# Patient Record
Sex: MALE | Race: WHITE | NOT HISPANIC OR LATINO | Employment: OTHER | URBAN - METROPOLITAN AREA
[De-identification: names, ages, dates, MRNs, and addresses within clinical notes are randomized per-mention and may not be internally consistent; named-entity substitution may affect disease eponyms.]

---

## 2018-05-25 ENCOUNTER — TELEPHONE (OUTPATIENT)
Dept: PAIN MEDICINE | Facility: MEDICAL CENTER | Age: 63
End: 2018-05-25

## 2018-05-25 NOTE — TELEPHONE ENCOUNTER
[facility]- patient called to schedule  Intake completed and sent to [facility] office  Patient to have prior records sent for review   (Dr Nancy Burton- [phone])

## 2018-06-07 ENCOUNTER — TELEPHONE (OUTPATIENT)
Dept: PAIN MEDICINE | Facility: CLINIC | Age: 63
End: 2018-06-07

## 2018-06-07 NOTE — TELEPHONE ENCOUNTER
Intake completed per Livia White at Dr Rajan Lei office fax is down unable will try to request prior pain management records

## 2018-06-07 NOTE — TELEPHONE ENCOUNTER
Lmom with Dr Es Castañeda office to call back to complete intake  Per message it states this office is a ortho office once I receive call back I will confirm and ask if they are also a pain management office

## 2018-06-12 NOTE — TELEPHONE ENCOUNTER
Pt called asking to make appt, informed him that we are still waiting to get pain records from Dr Silvino Herring office  Pt began to say that he would like to see Dr Henrique Trivedi now for a new problem while we wait for Dr Karyn Ring to fax over records  Informed pt that he cannot be seen until Dr Henrique Trivedi receives all pain records and reviews them  Pt then said he was going to get a referral from his PCP to be seen right away, informed pt again that we cannot schedule until we receive prior pain records  Pt said okay and disconnected  Please advise

## 2018-06-13 ENCOUNTER — TELEPHONE (OUTPATIENT)
Dept: PAIN MEDICINE | Facility: CLINIC | Age: 63
End: 2018-06-13

## 2018-06-13 NOTE — TELEPHONE ENCOUNTER
Received physician order from [facility] from Dr Nancy Irwin office waiting on prior pain management records

## 2018-06-19 NOTE — TELEPHONE ENCOUNTER
Pt is calling to make appt  Informed pt we are still waiting for the prior pain records  Pt states it was faxed over a couple of days ago, no task here indicating it was  Please advise

## 2018-06-25 ENCOUNTER — TELEPHONE (OUTPATIENT)
Dept: PAIN MEDICINE | Facility: CLINIC | Age: 63
End: 2018-06-25

## 2018-06-26 ENCOUNTER — TELEPHONE (OUTPATIENT)
Dept: PAIN MEDICINE | Facility: CLINIC | Age: 63
End: 2018-06-26

## 2020-12-11 ENCOUNTER — APPOINTMENT (EMERGENCY)
Dept: RADIOLOGY | Facility: HOSPITAL | Age: 65
End: 2020-12-11
Payer: COMMERCIAL

## 2020-12-11 ENCOUNTER — APPOINTMENT (EMERGENCY)
Dept: CT IMAGING | Facility: HOSPITAL | Age: 65
End: 2020-12-11
Payer: COMMERCIAL

## 2020-12-11 ENCOUNTER — HOSPITAL ENCOUNTER (OUTPATIENT)
Facility: HOSPITAL | Age: 65
Setting detail: OBSERVATION
Discharge: HOME | End: 2020-12-13
Attending: INTERNAL MEDICINE | Admitting: INTERNAL MEDICINE
Payer: COMMERCIAL

## 2020-12-11 DIAGNOSIS — E11.65 HYPERGLYCEMIA DUE TO DIABETES MELLITUS (HCC): ICD-10-CM

## 2020-12-11 DIAGNOSIS — R53.1 WEAKNESS: Primary | ICD-10-CM

## 2020-12-11 DIAGNOSIS — Z72.0 TOBACCO ABUSE: ICD-10-CM

## 2020-12-11 DIAGNOSIS — F10.929 ALCOHOL INTOXICATION (HCC): ICD-10-CM

## 2020-12-11 DIAGNOSIS — E11.9 TYPE 2 DIABETES MELLITUS (HCC): ICD-10-CM

## 2020-12-11 PROBLEM — R79.89 LFT ELEVATION: Status: ACTIVE | Noted: 2020-12-11

## 2020-12-11 LAB
ALBUMIN SERPL BCP-MCNC: 3.9 G/DL (ref 3.4–4.8)
ALP SERPL-CCNC: 85.5 U/L (ref 10–129)
ALT SERPL W P-5'-P-CCNC: 210 U/L (ref 5–63)
ANION GAP SERPL CALCULATED.3IONS-SCNC: 12 MMOL/L (ref 4–13)
AST SERPL W P-5'-P-CCNC: 40 U/L (ref 15–41)
ATRIAL RATE: 83 BPM
BASOPHILS # BLD AUTO: 0.05 THOUSANDS/ΜL (ref 0–0.1)
BASOPHILS NFR BLD AUTO: 1 % (ref 0–1)
BILIRUB SERPL-MCNC: 0.57 MG/DL (ref 0.3–1.2)
BUN SERPL-MCNC: 7 MG/DL (ref 6–20)
CALCIUM SERPL-MCNC: 8.6 MG/DL (ref 8.4–10.2)
CHLORIDE SERPL-SCNC: 97 MMOL/L (ref 96–108)
CO2 SERPL-SCNC: 28 MMOL/L (ref 22–33)
CREAT SERPL-MCNC: 0.75 MG/DL (ref 0.5–1.2)
EOSINOPHIL # BLD AUTO: 0.03 THOUSAND/ΜL (ref 0–0.61)
EOSINOPHIL NFR BLD AUTO: 1 % (ref 0–6)
ERYTHROCYTE [DISTWIDTH] IN BLOOD BY AUTOMATED COUNT: 12.9 % (ref 11.6–15.1)
ETHANOL SERPL-MCNC: 389.3 MG/DL
GFR SERPL CREATININE-BSD FRML MDRD: 98 ML/MIN/1.73SQ M
GLUCOSE SERPL-MCNC: 186 MG/DL (ref 65–140)
GLUCOSE SERPL-MCNC: 353 MG/DL (ref 65–140)
HCT VFR BLD AUTO: 42.1 % (ref 36.5–49.3)
HGB BLD-MCNC: 14.3 G/DL (ref 12–17)
IMM GRANULOCYTES # BLD AUTO: 0.03 THOUSAND/UL (ref 0–0.2)
IMM GRANULOCYTES NFR BLD AUTO: 1 % (ref 0–2)
LIPASE SERPL-CCNC: 10 U/L (ref 13–60)
LYMPHOCYTES # BLD AUTO: 2.35 THOUSANDS/ΜL (ref 0.6–4.47)
LYMPHOCYTES NFR BLD AUTO: 42 % (ref 14–44)
MCH RBC QN AUTO: 37.1 PG (ref 26.8–34.3)
MCHC RBC AUTO-ENTMCNC: 34 G/DL (ref 31.4–37.4)
MCV RBC AUTO: 109 FL (ref 82–98)
MONOCYTES # BLD AUTO: 0.69 THOUSAND/ΜL (ref 0.17–1.22)
MONOCYTES NFR BLD AUTO: 12 % (ref 4–12)
NEUTROPHILS # BLD AUTO: 2.47 THOUSANDS/ΜL (ref 1.85–7.62)
NEUTS SEG NFR BLD AUTO: 43 % (ref 43–75)
P AXIS: -5 DEGREES
PLATELET # BLD AUTO: 239 THOUSANDS/UL (ref 149–390)
PMV BLD AUTO: 9.5 FL (ref 8.9–12.7)
POTASSIUM SERPL-SCNC: 3.5 MMOL/L (ref 3.5–5)
PR INTERVAL: 138 MS
PROT SERPL-MCNC: 6.6 G/DL (ref 6.4–8.3)
QRS AXIS: 71 DEGREES
QRSD INTERVAL: 90 MS
QT INTERVAL: 380 MS
QTC INTERVAL: 447 MS
RBC # BLD AUTO: 3.85 MILLION/UL (ref 3.88–5.62)
SODIUM SERPL-SCNC: 137 MMOL/L (ref 133–145)
T WAVE AXIS: 241 DEGREES
VENTRICULAR RATE: 83 BPM
WBC # BLD AUTO: 5.62 THOUSAND/UL (ref 4.31–10.16)

## 2020-12-11 PROCEDURE — 70450 CT HEAD/BRAIN W/O DYE: CPT

## 2020-12-11 PROCEDURE — 93010 ELECTROCARDIOGRAM REPORT: CPT | Performed by: INTERNAL MEDICINE

## 2020-12-11 PROCEDURE — G1004 CDSM NDSC: HCPCS

## 2020-12-11 PROCEDURE — 99285 EMERGENCY DEPT VISIT HI MDM: CPT | Performed by: PHYSICIAN ASSISTANT

## 2020-12-11 PROCEDURE — 83690 ASSAY OF LIPASE: CPT | Performed by: PHYSICIAN ASSISTANT

## 2020-12-11 PROCEDURE — 36415 COLL VENOUS BLD VENIPUNCTURE: CPT | Performed by: PHYSICIAN ASSISTANT

## 2020-12-11 PROCEDURE — 80320 DRUG SCREEN QUANTALCOHOLS: CPT | Performed by: PHYSICIAN ASSISTANT

## 2020-12-11 PROCEDURE — 80053 COMPREHEN METABOLIC PANEL: CPT | Performed by: PHYSICIAN ASSISTANT

## 2020-12-11 PROCEDURE — 82948 REAGENT STRIP/BLOOD GLUCOSE: CPT

## 2020-12-11 PROCEDURE — 99220 PR INITIAL OBSERVATION CARE/DAY 70 MINUTES: CPT | Performed by: INTERNAL MEDICINE

## 2020-12-11 PROCEDURE — 71045 X-RAY EXAM CHEST 1 VIEW: CPT

## 2020-12-11 PROCEDURE — 90682 RIV4 VACC RECOMBINANT DNA IM: CPT | Performed by: INTERNAL MEDICINE

## 2020-12-11 PROCEDURE — 96360 HYDRATION IV INFUSION INIT: CPT

## 2020-12-11 PROCEDURE — 85025 COMPLETE CBC W/AUTO DIFF WBC: CPT | Performed by: PHYSICIAN ASSISTANT

## 2020-12-11 PROCEDURE — 93005 ELECTROCARDIOGRAM TRACING: CPT

## 2020-12-11 PROCEDURE — 99285 EMERGENCY DEPT VISIT HI MDM: CPT

## 2020-12-11 PROCEDURE — 90471 IMMUNIZATION ADMIN: CPT | Performed by: INTERNAL MEDICINE

## 2020-12-11 PROCEDURE — 94762 N-INVAS EAR/PLS OXIMTRY CONT: CPT

## 2020-12-11 PROCEDURE — 83036 HEMOGLOBIN GLYCOSYLATED A1C: CPT | Performed by: INTERNAL MEDICINE

## 2020-12-11 RX ORDER — NICOTINE 21 MG/24HR
1 PATCH, TRANSDERMAL 24 HOURS TRANSDERMAL DAILY
Status: DISCONTINUED | OUTPATIENT
Start: 2020-12-12 | End: 2020-12-13 | Stop reason: HOSPADM

## 2020-12-11 RX ORDER — ONDANSETRON 2 MG/ML
4 INJECTION INTRAMUSCULAR; INTRAVENOUS EVERY 6 HOURS PRN
Status: DISCONTINUED | OUTPATIENT
Start: 2020-12-11 | End: 2020-12-13 | Stop reason: HOSPADM

## 2020-12-11 RX ORDER — DEXTROSE AND SODIUM CHLORIDE 5; .45 G/100ML; G/100ML
75 INJECTION, SOLUTION INTRAVENOUS CONTINUOUS
Status: DISCONTINUED | OUTPATIENT
Start: 2020-12-11 | End: 2020-12-11

## 2020-12-11 RX ORDER — LANOLIN ALCOHOL/MO/W.PET/CERES
400 CREAM (GRAM) TOPICAL DAILY
Status: DISCONTINUED | OUTPATIENT
Start: 2020-12-12 | End: 2020-12-13 | Stop reason: HOSPADM

## 2020-12-11 RX ORDER — SODIUM CHLORIDE, SODIUM LACTATE, POTASSIUM CHLORIDE, CALCIUM CHLORIDE 600; 310; 30; 20 MG/100ML; MG/100ML; MG/100ML; MG/100ML
50 INJECTION, SOLUTION INTRAVENOUS CONTINUOUS
Status: DISCONTINUED | OUTPATIENT
Start: 2020-12-11 | End: 2020-12-13 | Stop reason: HOSPADM

## 2020-12-11 RX ORDER — THIAMINE MONONITRATE (VIT B1) 100 MG
100 TABLET ORAL DAILY
Status: DISCONTINUED | OUTPATIENT
Start: 2020-12-12 | End: 2020-12-13 | Stop reason: HOSPADM

## 2020-12-11 RX ORDER — ACETAMINOPHEN 325 MG/1
650 TABLET ORAL EVERY 6 HOURS PRN
Status: DISCONTINUED | OUTPATIENT
Start: 2020-12-11 | End: 2020-12-13 | Stop reason: HOSPADM

## 2020-12-11 RX ADMIN — INSULIN HUMAN 10 UNITS: 100 INJECTION, SOLUTION PARENTERAL at 17:03

## 2020-12-11 RX ADMIN — INFLUENZA A VIRUS A/HAWAII/70/2019 (H1N1) RECOMBINANT HEMAGGLUTININ ANTIGEN, INFLUENZA A VIRUS A/MINNESOTA/41/2019 (H3N2) RECOMBINANT HEMAGGLUTININ ANTIGEN, INFLUENZA B VIRUS B/WASHINGTON/02/2019 RECOMBINANT HEMAGGLUTININ ANTIGEN, AND INFLUENZA B VIRUS B/PHUKET/3073/2013 RECOMBINANT HEMAGGLUTININ ANTIGEN 0.5 ML: 45; 45; 45; 45 INJECTION INTRAMUSCULAR at 20:59

## 2020-12-11 RX ADMIN — SODIUM CHLORIDE 1000 ML: 0.9 INJECTION, SOLUTION INTRAVENOUS at 16:02

## 2020-12-11 RX ADMIN — SODIUM CHLORIDE, SODIUM LACTATE, POTASSIUM CHLORIDE, AND CALCIUM CHLORIDE 50 ML/HR: .6; .31; .03; .02 INJECTION, SOLUTION INTRAVENOUS at 20:58

## 2020-12-12 LAB
ALBUMIN SERPL BCP-MCNC: 3.3 G/DL (ref 3.4–4.8)
ALP SERPL-CCNC: 73.7 U/L (ref 10–129)
ALT SERPL W P-5'-P-CCNC: 158 U/L (ref 5–63)
AMPHETAMINES SERPL QL SCN: NEGATIVE
ANION GAP SERPL CALCULATED.3IONS-SCNC: 11 MMOL/L (ref 4–13)
AST SERPL W P-5'-P-CCNC: 52 U/L (ref 15–41)
BACTERIA UR QL AUTO: ABNORMAL /HPF
BARBITURATES UR QL: NEGATIVE
BASOPHILS # BLD AUTO: 0.03 THOUSANDS/ΜL (ref 0–0.1)
BASOPHILS NFR BLD AUTO: 0 % (ref 0–1)
BENZODIAZ UR QL: NEGATIVE
BILIRUB SERPL-MCNC: 0.95 MG/DL (ref 0.3–1.2)
BILIRUB UR QL STRIP: NEGATIVE
BUN SERPL-MCNC: 8 MG/DL (ref 6–20)
CALCIUM ALBUM COR SERPL-MCNC: 9 MG/DL (ref 8.3–10.1)
CALCIUM SERPL-MCNC: 8.4 MG/DL (ref 8.4–10.2)
CHLORIDE SERPL-SCNC: 99 MMOL/L (ref 96–108)
CLARITY UR: CLEAR
CO2 SERPL-SCNC: 25 MMOL/L (ref 22–33)
COCAINE UR QL: NEGATIVE
COLOR UR: YELLOW
CREAT SERPL-MCNC: 0.61 MG/DL (ref 0.5–1.2)
EOSINOPHIL # BLD AUTO: 0.01 THOUSAND/ΜL (ref 0–0.61)
EOSINOPHIL NFR BLD AUTO: 0 % (ref 0–6)
ERYTHROCYTE [DISTWIDTH] IN BLOOD BY AUTOMATED COUNT: 12.9 % (ref 11.6–15.1)
EST. AVERAGE GLUCOSE BLD GHB EST-MCNC: 203 MG/DL
GFR SERPL CREATININE-BSD FRML MDRD: 106 ML/MIN/1.73SQ M
GLUCOSE P FAST SERPL-MCNC: 236 MG/DL (ref 70–105)
GLUCOSE SERPL-MCNC: 202 MG/DL (ref 65–140)
GLUCOSE SERPL-MCNC: 236 MG/DL (ref 65–140)
GLUCOSE SERPL-MCNC: 258 MG/DL (ref 65–140)
GLUCOSE SERPL-MCNC: 276 MG/DL (ref 65–140)
GLUCOSE UR STRIP-MCNC: ABNORMAL MG/DL
HBA1C MFR BLD: 8.7 %
HCT VFR BLD AUTO: 35.8 % (ref 36.5–49.3)
HGB BLD-MCNC: 12.2 G/DL (ref 12–17)
HGB UR QL STRIP.AUTO: ABNORMAL
IMM GRANULOCYTES # BLD AUTO: 0.02 THOUSAND/UL (ref 0–0.2)
IMM GRANULOCYTES NFR BLD AUTO: 0 % (ref 0–2)
KETONES UR STRIP-MCNC: ABNORMAL MG/DL
LEUKOCYTE ESTERASE UR QL STRIP: NEGATIVE
LYMPHOCYTES # BLD AUTO: 1.42 THOUSANDS/ΜL (ref 0.6–4.47)
LYMPHOCYTES NFR BLD AUTO: 16 % (ref 14–44)
MAGNESIUM SERPL-MCNC: 1.6 MG/DL (ref 1.6–2.6)
MCH RBC QN AUTO: 37.1 PG (ref 26.8–34.3)
MCHC RBC AUTO-ENTMCNC: 34.1 G/DL (ref 31.4–37.4)
MCV RBC AUTO: 109 FL (ref 82–98)
METHADONE UR QL: NEGATIVE
MONOCYTES # BLD AUTO: 1.35 THOUSAND/ΜL (ref 0.17–1.22)
MONOCYTES NFR BLD AUTO: 15 % (ref 4–12)
MUCOUS THREADS UR QL AUTO: ABNORMAL
NEUTROPHILS # BLD AUTO: 6.24 THOUSANDS/ΜL (ref 1.85–7.62)
NEUTS SEG NFR BLD AUTO: 69 % (ref 43–75)
NITRITE UR QL STRIP: NEGATIVE
NON-SQ EPI CELLS URNS QL MICRO: ABNORMAL /HPF
OPIATES UR QL SCN: NEGATIVE
OXYCODONE+OXYMORPHONE UR QL SCN: NEGATIVE
PCP UR QL: NEGATIVE
PH UR STRIP.AUTO: 6 [PH]
PLATELET # BLD AUTO: 201 THOUSANDS/UL (ref 149–390)
PMV BLD AUTO: 9.7 FL (ref 8.9–12.7)
POTASSIUM SERPL-SCNC: 3.7 MMOL/L (ref 3.5–5)
PROT SERPL-MCNC: 5.7 G/DL (ref 6.4–8.3)
PROT UR STRIP-MCNC: ABNORMAL MG/DL
RBC # BLD AUTO: 3.29 MILLION/UL (ref 3.88–5.62)
RBC #/AREA URNS AUTO: ABNORMAL /HPF
SODIUM SERPL-SCNC: 135 MMOL/L (ref 133–145)
SP GR UR STRIP.AUTO: 1.02 (ref 1–1.03)
THC UR QL: NEGATIVE
TSH SERPL DL<=0.05 MIU/L-ACNC: 0.63 UIU/ML (ref 0.34–5.6)
UROBILINOGEN UR QL STRIP.AUTO: 1 E.U./DL
WBC # BLD AUTO: 9.07 THOUSAND/UL (ref 4.31–10.16)
WBC #/AREA URNS AUTO: ABNORMAL /HPF

## 2020-12-12 PROCEDURE — 81001 URINALYSIS AUTO W/SCOPE: CPT | Performed by: PHYSICIAN ASSISTANT

## 2020-12-12 PROCEDURE — 84443 ASSAY THYROID STIM HORMONE: CPT | Performed by: INTERNAL MEDICINE

## 2020-12-12 PROCEDURE — 82948 REAGENT STRIP/BLOOD GLUCOSE: CPT

## 2020-12-12 PROCEDURE — 83735 ASSAY OF MAGNESIUM: CPT | Performed by: INTERNAL MEDICINE

## 2020-12-12 PROCEDURE — 99226 PR SBSQ OBSERVATION CARE/DAY 35 MINUTES: CPT | Performed by: INTERNAL MEDICINE

## 2020-12-12 PROCEDURE — 85025 COMPLETE CBC W/AUTO DIFF WBC: CPT | Performed by: INTERNAL MEDICINE

## 2020-12-12 PROCEDURE — 94760 N-INVAS EAR/PLS OXIMETRY 1: CPT

## 2020-12-12 PROCEDURE — 80053 COMPREHEN METABOLIC PANEL: CPT | Performed by: INTERNAL MEDICINE

## 2020-12-12 PROCEDURE — 80307 DRUG TEST PRSMV CHEM ANLYZR: CPT | Performed by: PHYSICIAN ASSISTANT

## 2020-12-12 PROCEDURE — 94762 N-INVAS EAR/PLS OXIMTRY CONT: CPT

## 2020-12-12 RX ORDER — MAGNESIUM SULFATE HEPTAHYDRATE 40 MG/ML
2 INJECTION, SOLUTION INTRAVENOUS ONCE
Status: COMPLETED | OUTPATIENT
Start: 2020-12-12 | End: 2020-12-12

## 2020-12-12 RX ORDER — LORAZEPAM 2 MG/ML
1 INJECTION INTRAMUSCULAR EVERY 4 HOURS PRN
Status: DISCONTINUED | OUTPATIENT
Start: 2020-12-12 | End: 2020-12-13 | Stop reason: HOSPADM

## 2020-12-12 RX ADMIN — METFORMIN HYDROCHLORIDE 500 MG: 500 TABLET ORAL at 17:05

## 2020-12-12 RX ADMIN — Medication 1 PATCH: at 08:49

## 2020-12-12 RX ADMIN — ONDANSETRON 4 MG: 2 INJECTION INTRAMUSCULAR; INTRAVENOUS at 05:30

## 2020-12-12 RX ADMIN — INSULIN LISPRO 2 UNITS: 100 INJECTION, SOLUTION INTRAVENOUS; SUBCUTANEOUS at 12:29

## 2020-12-12 RX ADMIN — ENOXAPARIN SODIUM 40 MG: 40 INJECTION SUBCUTANEOUS at 10:17

## 2020-12-12 RX ADMIN — INSULIN LISPRO 2 UNITS: 100 INJECTION, SOLUTION INTRAVENOUS; SUBCUTANEOUS at 17:03

## 2020-12-12 RX ADMIN — INSULIN LISPRO 1 UNITS: 100 INJECTION, SOLUTION INTRAVENOUS; SUBCUTANEOUS at 08:38

## 2020-12-12 RX ADMIN — FOLIC ACID TAB 400 MCG 400 MCG: 400 TAB at 08:47

## 2020-12-12 RX ADMIN — SODIUM CHLORIDE, SODIUM LACTATE, POTASSIUM CHLORIDE, AND CALCIUM CHLORIDE 50 ML/HR: .6; .31; .03; .02 INJECTION, SOLUTION INTRAVENOUS at 19:42

## 2020-12-12 RX ADMIN — THIAMINE HCL TAB 100 MG 100 MG: 100 TAB at 08:47

## 2020-12-12 RX ADMIN — MAGNESIUM SULFATE HEPTAHYDRATE 2 G: 40 INJECTION, SOLUTION INTRAVENOUS at 12:33

## 2020-12-13 VITALS
RESPIRATION RATE: 16 BRPM | SYSTOLIC BLOOD PRESSURE: 161 MMHG | OXYGEN SATURATION: 96 % | TEMPERATURE: 97.7 F | HEART RATE: 85 BPM | DIASTOLIC BLOOD PRESSURE: 69 MMHG

## 2020-12-13 LAB
ALBUMIN SERPL BCP-MCNC: 3.1 G/DL (ref 3.4–4.8)
ALP SERPL-CCNC: 74.5 U/L (ref 10–129)
ALT SERPL W P-5'-P-CCNC: 122 U/L (ref 5–63)
ANION GAP SERPL CALCULATED.3IONS-SCNC: 8 MMOL/L (ref 4–13)
AST SERPL W P-5'-P-CCNC: 56 U/L (ref 15–41)
BILIRUB DIRECT SERPL-MCNC: 0.32 MG/DL (ref 0–0.3)
BILIRUB SERPL-MCNC: 0.86 MG/DL (ref 0.3–1.2)
BUN SERPL-MCNC: 7 MG/DL (ref 6–20)
CALCIUM SERPL-MCNC: 8.3 MG/DL (ref 8.4–10.2)
CHLORIDE SERPL-SCNC: 98 MMOL/L (ref 96–108)
CO2 SERPL-SCNC: 28 MMOL/L (ref 22–33)
CREAT SERPL-MCNC: 0.55 MG/DL (ref 0.5–1.2)
GFR SERPL CREATININE-BSD FRML MDRD: 111 ML/MIN/1.73SQ M
GLUCOSE P FAST SERPL-MCNC: 226 MG/DL (ref 70–105)
GLUCOSE SERPL-MCNC: 211 MG/DL (ref 65–140)
GLUCOSE SERPL-MCNC: 226 MG/DL (ref 65–140)
INR PPP: 0.99 (ref 0.9–1.1)
MAGNESIUM SERPL-MCNC: 1.8 MG/DL (ref 1.6–2.6)
POTASSIUM SERPL-SCNC: 3.6 MMOL/L (ref 3.5–5)
PROT SERPL-MCNC: 5.3 G/DL (ref 6.4–8.3)
PROTHROMBIN TIME: 11.2 SECONDS (ref 9.5–12.1)
SODIUM SERPL-SCNC: 134 MMOL/L (ref 133–145)
VIT B12 SERPL-MCNC: 672 PG/ML (ref 100–900)

## 2020-12-13 PROCEDURE — 83735 ASSAY OF MAGNESIUM: CPT | Performed by: INTERNAL MEDICINE

## 2020-12-13 PROCEDURE — 85610 PROTHROMBIN TIME: CPT | Performed by: INTERNAL MEDICINE

## 2020-12-13 PROCEDURE — 82948 REAGENT STRIP/BLOOD GLUCOSE: CPT

## 2020-12-13 PROCEDURE — 80048 BASIC METABOLIC PNL TOTAL CA: CPT | Performed by: INTERNAL MEDICINE

## 2020-12-13 PROCEDURE — 80076 HEPATIC FUNCTION PANEL: CPT | Performed by: INTERNAL MEDICINE

## 2020-12-13 PROCEDURE — 99217 PR OBSERVATION CARE DISCHARGE MANAGEMENT: CPT | Performed by: INTERNAL MEDICINE

## 2020-12-13 PROCEDURE — 82607 VITAMIN B-12: CPT | Performed by: INTERNAL MEDICINE

## 2020-12-13 RX ORDER — LANOLIN ALCOHOL/MO/W.PET/CERES
400 CREAM (GRAM) TOPICAL DAILY
Qty: 30 TABLET | Refills: 0 | Status: SHIPPED | OUTPATIENT
Start: 2020-12-14

## 2020-12-13 RX ORDER — NICOTINE 21 MG/24HR
1 PATCH, TRANSDERMAL 24 HOURS TRANSDERMAL DAILY
Qty: 28 PATCH | Refills: 0 | Status: SHIPPED | OUTPATIENT
Start: 2020-12-14

## 2020-12-13 RX ORDER — LANOLIN ALCOHOL/MO/W.PET/CERES
100 CREAM (GRAM) TOPICAL DAILY
Qty: 30 TABLET | Refills: 0 | Status: SHIPPED | OUTPATIENT
Start: 2020-12-14

## 2020-12-13 RX ADMIN — METFORMIN HYDROCHLORIDE 500 MG: 500 TABLET ORAL at 08:37

## 2020-12-13 RX ADMIN — THIAMINE HCL TAB 100 MG 100 MG: 100 TAB at 08:37

## 2020-12-13 RX ADMIN — Medication 1 PATCH: at 08:36

## 2020-12-13 RX ADMIN — ENOXAPARIN SODIUM 40 MG: 40 INJECTION SUBCUTANEOUS at 08:38

## 2020-12-13 RX ADMIN — MAGNESIUM GLUCONATE 500 MG ORAL TABLET 400 MG: 500 TABLET ORAL at 08:37

## 2020-12-13 RX ADMIN — FOLIC ACID TAB 400 MCG 400 MCG: 400 TAB at 08:37

## 2021-05-23 ENCOUNTER — HOSPITAL ENCOUNTER (INPATIENT)
Facility: HOSPITAL | Age: 66
LOS: 2 days | Discharge: HOME | DRG: 896 | End: 2021-05-25
Attending: EMERGENCY MEDICINE | Admitting: INTERNAL MEDICINE
Payer: COMMERCIAL

## 2021-05-23 ENCOUNTER — APPOINTMENT (EMERGENCY)
Dept: RADIOLOGY | Facility: HOSPITAL | Age: 66
DRG: 896 | End: 2021-05-23
Payer: COMMERCIAL

## 2021-05-23 ENCOUNTER — APPOINTMENT (EMERGENCY)
Dept: CT IMAGING | Facility: HOSPITAL | Age: 66
DRG: 896 | End: 2021-05-23
Payer: COMMERCIAL

## 2021-05-23 DIAGNOSIS — R41.89 UNRESPONSIVE: Primary | ICD-10-CM

## 2021-05-23 DIAGNOSIS — F10.10 ALCOHOL ABUSE: ICD-10-CM

## 2021-05-23 DIAGNOSIS — F10.221: ICD-10-CM

## 2021-05-23 DIAGNOSIS — E11.9 TYPE 2 DIABETES MELLITUS (HCC): ICD-10-CM

## 2021-05-23 DIAGNOSIS — R79.89 LFT ELEVATION: ICD-10-CM

## 2021-05-23 DIAGNOSIS — Z72.0 TOBACCO ABUSE: ICD-10-CM
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
## 2021-05-23 DIAGNOSIS — I10 HYPERTENSION: ICD-10-CM

## 2021-05-23 PROBLEM — J96.00 ACUTE RESPIRATORY FAILURE (HCC): Status: ACTIVE | Noted: 2021-05-23

## 2021-05-23 LAB
ALBUMIN SERPL BCP-MCNC: 3.6 G/DL (ref 3.5–5)
ALP SERPL-CCNC: 70 U/L (ref 46–116)
ALT SERPL W P-5'-P-CCNC: 70 U/L (ref 12–78)
AMPHETAMINES SERPL QL SCN: NEGATIVE
ANION GAP SERPL CALCULATED.3IONS-SCNC: 12 MMOL/L (ref 4–13)
APAP SERPL-MCNC: <2 UG/ML (ref 10–20)
AST SERPL W P-5'-P-CCNC: 34 U/L (ref 5–45)
BARBITURATES UR QL: NEGATIVE
BASE EXCESS BLDA CALC-SCNC: 0 MMOL/L (ref -2–3)
BASOPHILS # BLD AUTO: 0.08 THOUSANDS/ΜL (ref 0–0.1)
BASOPHILS NFR BLD AUTO: 1 % (ref 0–1)
BENZODIAZ UR QL: NEGATIVE
BILIRUB SERPL-MCNC: <0.1 MG/DL (ref 0.2–1)
BUN SERPL-MCNC: 14 MG/DL (ref 5–25)
CALCIUM SERPL-MCNC: 8.7 MG/DL (ref 8.3–10.1)
CHLORIDE SERPL-SCNC: 101 MMOL/L (ref 100–108)
CO2 SERPL-SCNC: 24 MMOL/L (ref 21–32)
COCAINE UR QL: NEGATIVE
CREAT SERPL-MCNC: 0.6 MG/DL (ref 0.6–1.3)
EOSINOPHIL # BLD AUTO: 0.05 THOUSAND/ΜL (ref 0–0.61)
EOSINOPHIL NFR BLD AUTO: 1 % (ref 0–6)
ERYTHROCYTE [DISTWIDTH] IN BLOOD BY AUTOMATED COUNT: 14.3 % (ref 11.6–15.1)
ETHANOL SERPL-MCNC: 427 MG/DL (ref 0–3)
FIO2 GAS DIL.REBREATH: 60 L
GFR SERPL CREATININE-BSD FRML MDRD: 106 ML/MIN/1.73SQ M
GLUCOSE SERPL-MCNC: 323 MG/DL (ref 65–140)
GLUCOSE SERPL-MCNC: 332 MG/DL (ref 65–140)
HCO3 BLDA-SCNC: 25.3 MMOL/L (ref 24–30)
HCT VFR BLD AUTO: 41 % (ref 36.5–49.3)
HCT VFR BLD CALC: 43 % (ref 36.5–49.3)
HGB BLD-MCNC: 13.8 G/DL (ref 12–17)
HGB BLDA-MCNC: 14.6 G/DL (ref 12–17)
IMM GRANULOCYTES # BLD AUTO: 0.02 THOUSAND/UL (ref 0–0.2)
IMM GRANULOCYTES NFR BLD AUTO: 0 % (ref 0–2)
LYMPHOCYTES # BLD AUTO: 2.19 THOUSANDS/ΜL (ref 0.6–4.47)
LYMPHOCYTES NFR BLD AUTO: 32 % (ref 14–44)
MCH RBC QN AUTO: 36 PG (ref 26.8–34.3)
MCHC RBC AUTO-ENTMCNC: 33.7 G/DL (ref 31.4–37.4)
MCV RBC AUTO: 107 FL (ref 82–98)
METHADONE UR QL: NEGATIVE
MONOCYTES # BLD AUTO: 0.98 THOUSAND/ΜL (ref 0.17–1.22)
MONOCYTES NFR BLD AUTO: 15 % (ref 4–12)
NEUTROPHILS # BLD AUTO: 3.43 THOUSANDS/ΜL (ref 1.85–7.62)
NEUTS SEG NFR BLD AUTO: 51 % (ref 43–75)
NRBC BLD AUTO-RTO: 0 /100 WBCS
OPIATES UR QL SCN: NEGATIVE
OXYCODONE+OXYMORPHONE UR QL SCN: NEGATIVE
PCO2 BLD: 27 MMOL/L (ref 21–32)
PCO2 BLD: 41.4 MM HG (ref 42–50)
PCP UR QL: NEGATIVE
PH BLD: 7.39 [PH] (ref 7.3–7.4)
PLATELET # BLD AUTO: 255 THOUSANDS/UL (ref 149–390)
PMV BLD AUTO: 8.4 FL (ref 8.9–12.7)
PO2 BLD: 345 MM HG (ref 35–45)
POTASSIUM BLD-SCNC: 4.3 MMOL/L (ref 3.5–5.3)
POTASSIUM SERPL-SCNC: 4.4 MMOL/L (ref 3.5–5.3)
PROT SERPL-MCNC: 7.3 G/DL (ref 6.4–8.2)
RBC # BLD AUTO: 3.83 MILLION/UL (ref 3.88–5.62)
SALICYLATES SERPL-MCNC: <3 MG/DL (ref 3–20)
SAO2 % BLD FROM PO2: 100 % (ref 60–85)
SODIUM BLD-SCNC: 136 MMOL/L (ref 136–145)
SODIUM SERPL-SCNC: 137 MMOL/L (ref 136–145)
SPECIMEN SOURCE: ABNORMAL
THC UR QL: NEGATIVE
TROPONIN I SERPL-MCNC: <0.02 NG/ML
TROPONIN I SERPL-MCNC: <0.02 NG/ML
WBC # BLD AUTO: 6.75 THOUSAND/UL (ref 4.31–10.16)

## 2021-05-23 PROCEDURE — 82077 ASSAY SPEC XCP UR&BREATH IA: CPT | Performed by: EMERGENCY MEDICINE

## 2021-05-23 PROCEDURE — G1004 CDSM NDSC: HCPCS

## 2021-05-23 PROCEDURE — 82947 ASSAY GLUCOSE BLOOD QUANT: CPT

## 2021-05-23 PROCEDURE — 80307 DRUG TEST PRSMV CHEM ANLYZR: CPT | Performed by: EMERGENCY MEDICINE

## 2021-05-23 PROCEDURE — 84484 ASSAY OF TROPONIN QUANT: CPT | Performed by: PHYSICIAN ASSISTANT

## 2021-05-23 PROCEDURE — 83036 HEMOGLOBIN GLYCOSYLATED A1C: CPT | Performed by: PHYSICIAN ASSISTANT

## 2021-05-23 PROCEDURE — 5A1935Z RESPIRATORY VENTILATION, LESS THAN 24 CONSECUTIVE HOURS: ICD-10-PCS | Performed by: INTERNAL MEDICINE

## 2021-05-23 PROCEDURE — 71045 X-RAY EXAM CHEST 1 VIEW: CPT

## 2021-05-23 PROCEDURE — 80143 DRUG ASSAY ACETAMINOPHEN: CPT | Performed by: EMERGENCY MEDICINE

## 2021-05-23 PROCEDURE — 80053 COMPREHEN METABOLIC PANEL: CPT | Performed by: EMERGENCY MEDICINE

## 2021-05-23 PROCEDURE — 99285 EMERGENCY DEPT VISIT HI MDM: CPT

## 2021-05-23 PROCEDURE — 80179 DRUG ASSAY SALICYLATE: CPT | Performed by: EMERGENCY MEDICINE

## 2021-05-23 PROCEDURE — 94002 VENT MGMT INPAT INIT DAY: CPT

## 2021-05-23 PROCEDURE — 84295 ASSAY OF SERUM SODIUM: CPT

## 2021-05-23 PROCEDURE — 82948 REAGENT STRIP/BLOOD GLUCOSE: CPT

## 2021-05-23 PROCEDURE — 31500 INSERT EMERGENCY AIRWAY: CPT | Performed by: EMERGENCY MEDICINE

## 2021-05-23 PROCEDURE — 84132 ASSAY OF SERUM POTASSIUM: CPT

## 2021-05-23 PROCEDURE — 0BH17EZ INSERTION OF ENDOTRACHEAL AIRWAY INTO TRACHEA, VIA NATURAL OR ARTIFICIAL OPENING: ICD-10-PCS | Performed by: EMERGENCY MEDICINE

## 2021-05-23 PROCEDURE — 84484 ASSAY OF TROPONIN QUANT: CPT | Performed by: EMERGENCY MEDICINE

## 2021-05-23 PROCEDURE — 36415 COLL VENOUS BLD VENIPUNCTURE: CPT | Performed by: EMERGENCY MEDICINE

## 2021-05-23 PROCEDURE — 85025 COMPLETE CBC W/AUTO DIFF WBC: CPT | Performed by: EMERGENCY MEDICINE

## 2021-05-23 PROCEDURE — 99291 CRITICAL CARE FIRST HOUR: CPT | Performed by: PHYSICIAN ASSISTANT

## 2021-05-23 PROCEDURE — 99291 CRITICAL CARE FIRST HOUR: CPT | Performed by: EMERGENCY MEDICINE

## 2021-05-23 PROCEDURE — 82803 BLOOD GASES ANY COMBINATION: CPT

## 2021-05-23 PROCEDURE — 85014 HEMATOCRIT: CPT

## 2021-05-23 PROCEDURE — 93005 ELECTROCARDIOGRAM TRACING: CPT

## 2021-05-23 PROCEDURE — 70450 CT HEAD/BRAIN W/O DYE: CPT

## 2021-05-23 RX ORDER — ETOMIDATE 2 MG/ML
20 INJECTION INTRAVENOUS ONCE
Status: COMPLETED | OUTPATIENT
Start: 2021-05-23 | End: 2021-05-23

## 2021-05-23 RX ORDER — SUCCINYLCHOLINE/SOD CL,ISO/PF 100 MG/5ML
100 SYRINGE (ML) INTRAVENOUS ONCE
Status: COMPLETED | OUTPATIENT
Start: 2021-05-23 | End: 2021-05-23

## 2021-05-23 RX ORDER — CHLORHEXIDINE GLUCONATE 0.12 MG/ML
15 RINSE ORAL EVERY 12 HOURS SCHEDULED
Status: DISCONTINUED | OUTPATIENT
Start: 2021-05-23 | End: 2021-05-24

## 2021-05-23 RX ORDER — FOLIC ACID 1 MG/1
1 TABLET ORAL DAILY
Status: DISCONTINUED | OUTPATIENT
Start: 2021-05-24 | End: 2021-05-24

## 2021-05-23 RX ORDER — NICOTINE 21 MG/24HR
1 PATCH, TRANSDERMAL 24 HOURS TRANSDERMAL DAILY
Status: DISCONTINUED | OUTPATIENT
Start: 2021-05-24 | End: 2021-05-25 | Stop reason: HOSPADM

## 2021-05-23 RX ORDER — PROPOFOL 10 MG/ML
5-50 INJECTION, EMULSION INTRAVENOUS
Status: DISCONTINUED | OUTPATIENT
Start: 2021-05-23 | End: 2021-05-24

## 2021-05-23 RX ORDER — SODIUM CHLORIDE, SODIUM GLUCONATE, SODIUM ACETATE, POTASSIUM CHLORIDE, MAGNESIUM CHLORIDE, SODIUM PHOSPHATE, DIBASIC, AND POTASSIUM PHOSPHATE .53; .5; .37; .037; .03; .012; .00082 G/100ML; G/100ML; G/100ML; G/100ML; G/100ML; G/100ML; G/100ML
125 INJECTION, SOLUTION INTRAVENOUS CONTINUOUS
Status: DISCONTINUED | OUTPATIENT
Start: 2021-05-23 | End: 2021-05-25 | Stop reason: HOSPADM

## 2021-05-23 RX ADMIN — PROPOFOL 5 MCG/KG/MIN: 10 INJECTION, EMULSION INTRAVENOUS at 19:17

## 2021-05-23 RX ADMIN — Medication 100 MG: at 19:12

## 2021-05-23 RX ADMIN — ETOMIDATE 20 MG: 2 INJECTION, SOLUTION INTRAVENOUS at 19:12

## 2021-05-23 RX ADMIN — PROPOFOL 50 MCG/KG/MIN: 10 INJECTION, EMULSION INTRAVENOUS at 23:51

## 2021-05-23 NOTE — ED PROVIDER NOTES
History  Chief Complaint   Patient presents with    Medical Problem - Major     pt found unresponsive, neighbors called EMS      61 y o  male presents via EMS in an unresponsive state  According to EMS neighbors called for unresponsiveness  EMS reports the home was in poor condition  Patient's pmh is significant for COPD, Depression, and NDDM  History provided by:  Medical records and EMS personnel   used: No    Altered Mental Status  Presenting symptoms: unresponsiveness    Severity:  Severe  Most recent episode: Today  Episode history:  Unable to specify  Timing:  Unable to specify  Chronicity:  New  Context: alcohol use (suspected)    Associated symptoms: no fever        Prior to Admission Medications   Prescriptions Last Dose Informant Patient Reported? Taking?   folic acid (FOLVITE) 437 mcg tablet   No No   Sig: Take 1 tablet (400 mcg total) by mouth daily   magnesium oxide (MAG-OX) 400 mg   No No   Sig: Take 1 tablet (400 mg total) by mouth daily   metFORMIN (GLUCOPHAGE) 500 mg tablet   No No   Sig: Take 1 tablet (500 mg total) by mouth 2 (two) times a day with meals   nicotine (NICODERM CQ) 14 mg/24hr TD 24 hr patch   No No   Sig: Place 1 patch on the skin daily   thiamine 100 MG tablet   No No   Sig: Take 1 tablet (100 mg total) by mouth daily      Facility-Administered Medications: None       Past Medical History:   Diagnosis Date    COPD (chronic obstructive pulmonary disease) ([address] )     Depression     Diabetes mellitus ([facility] )     Hyperlipidemia     Hypertension        Past Surgical History:   Procedure Laterality Date    APPENDECTOMY      TONSILLECTOMY AND ADENOIDECTOMY         History reviewed  No pertinent family history  I have reviewed and agree with the history as documented      E-Cigarette/Vaping     E-Cigarette/Vaping Substances     Social History     Tobacco Use    Smoking status: Current Every Day Smoker    Smokeless tobacco: Never Used   Substance Use Topics    Alcohol use: Yes     Frequency: 2-3 times a week     Drinks per session: Patient refused     Binge frequency: Patient refused    Drug use: Not Currently       Review of Systems   Unable to perform ROS: Patient unresponsive   Constitutional: Negative for fever  Physical Exam  Physical Exam  Constitutional:       General: He is in acute distress  Appearance: Normal appearance  He is normal weight  He is not diaphoretic  HENT:      Head: Normocephalic and atraumatic  Eyes:      General:         Right eye: No discharge  Left eye: No discharge  Pupils: Pupils are equal, round, and reactive to light  Cardiovascular:      Rate and Rhythm: Normal rate  Pulses: Normal pulses  Heart sounds: Normal heart sounds  Pulmonary:      Effort: Pulmonary effort is normal  No respiratory distress  Abdominal:      General: There is no distension  Palpations: There is no mass  Musculoskeletal:         General: No deformity  Right lower leg: No edema  Left lower leg: No edema  Skin:     Coloration: Skin is pale  Neurological:      Mental Status: He is unresponsive  GCS: GCS eye subscore is 1  GCS verbal subscore is 1  GCS motor subscore is 4     Psychiatric:      Comments: Unable to determine due to altered mental status           Vital Signs  ED Triage Vitals   Temperature Pulse Respirations Blood Pressure SpO2   05/23/21 1927 05/23/21 1902 05/23/21 1902 05/23/21 1902 05/23/21 1902   (!) 96 4 °F (35 8 °C) 86 16 143/70 97 %      Temp Source Heart Rate Source Patient Position - Orthostatic VS BP Location FiO2 (%)   05/23/21 2125 05/23/21 1902 05/23/21 1902 05/23/21 1902 05/23/21 2011   Bladder Monitor Lying Right arm 40      Pain Score       --                  Vitals:    05/23/21 2315 05/23/21 2343 05/24/21 0000 05/24/21 0030   BP: 142/77 (!) 238/112 115/56 94/56   Pulse: 86 98 91 87   Patient Position - Orthostatic VS: Lying Lying           Visual Acuity      ED Medications  Medications   propofol (DIPRIVAN) 1000 mg in 100 mL infusion (premix) (45 mcg/kg/min × 72 6 kg Intravenous New Bag 5/24/21 0029)   nicotine (NICODERM CQ) 14 mg/24hr TD 24 hr patch 1 patch (has no administration in time range)   chlorhexidine (PERIDEX) 0 12 % oral rinse 15 mL (15 mL Mouth/Throat Given 5/24/21 0029)   multi-electrolyte (PLASMALYTE-A/ISOLYTE-S PH 7 4) IV solution (125 mL/hr Intravenous New Bag 5/24/21 0008)   enoxaparin (LOVENOX) subcutaneous injection 40 mg (has no administration in time range)   insulin lispro (HumaLOG) 100 units/mL subcutaneous injection 1-6 Units (has no administration in time range)   cyanocobalamin (VITAMIN B-12) tablet 100 mcg (has no administration in time range)   folic acid (FOLVITE) tablet 1 mg (has no administration in time range)   multivitamin-minerals (CENTRUM) tablet 1 tablet (has no administration in time range)   etomidate (AMIDATE) 2 mg/mL injection 20 mg (20 mg Intravenous Given 5/23/21 1912)   Succinylcholine Chloride 100 mg/5 mL syringe 100 mg (100 mg Intravenous Given 5/23/21 1912)       Diagnostic Studies  Results Reviewed     Procedure Component Value Units Date/Time    Hemoglobin A1c w/EAG Estimation (Orders if not completed within the last 90 days) [[id number]] Collected: 05/23/21 1929    Lab Status:  In process Specimen: Blood from Arm, Right Updated: 05/23/21 2347    Troponin I [[id number]]     Lab Status: No result Specimen: Blood     Platelet count [[id number]]     Lab Status: No result Specimen: Blood     Troponin I [[id number]]  (Normal) Collected: 05/23/21 2254    Lab Status: Final result Specimen: Blood from Arm, Left Updated: 05/23/21 2319     Troponin I <0 02 ng/mL     Comprehensive metabolic panel [[id number]]  (Abnormal) Collected: 05/23/21 1929    Lab Status: Final result Specimen: Blood from Arm, Right Updated: 05/23/21 2007     Sodium 137 mmol/L      Potassium 4 4 mmol/L      Chloride 101 mmol/L      CO2 24 mmol/L ANION GAP 12 mmol/L      BUN 14 mg/dL      Creatinine 0 60 mg/dL      Glucose 332 mg/dL      Calcium 8 7 mg/dL      AST 34 U/L      ALT 70 U/L      Alkaline Phosphatase 70 U/L      Total Protein 7 3 g/dL      Albumin 3 6 g/dL      Total Bilirubin <0 10 mg/dL      eGFR 106 ml/min/1 73sq m     Narrative:      [place] guidelines for Chronic Kidney Disease (CKD):     Stage 1 with normal or high GFR (GFR > 90 mL/min/1 73 square meters)    Stage 2 Mild CKD (GFR = 60-89 mL/min/1 73 square meters)    Stage 3A Moderate CKD (GFR = 45-59 mL/min/1 73 square meters)    Stage 3B Moderate CKD (GFR = 30-44 mL/min/1 73 square meters)    Stage 4 Severe CKD (GFR = 15-29 mL/min/1 73 square meters)    Stage 5 End Stage CKD (GFR <15 mL/min/1 73 square meters)  Note: GFR calculation is accurate only with a steady state creatinine    POCT Blood Gas (CG8+) [[id number]]  (Abnormal) Collected: 05/23/21 2000    Lab Status: Final result Specimen: Venous Updated: 05/23/21 2006     ph, Jorge ISTAT 7 394     pCO2, Jorge i-STAT 41 4 mm HG      pO2, Jorge i-STAT 345 0 mm HG      BE, i-STAT 0 mmol/L      HCO3, Jorge i-STAT 25 3 mmol/L      CO2, i-STAT 27 mmol/L      O2 Sat, i-STAT 100 %      SODIUM, I-STAT 136 mmol/l      Potassium, i-STAT 4 3 mmol/L      Hct, i-STAT 43 %      Hgb, i-STAT 14 6 g/dl      Glucose, i-STAT 323 mg/dl      POC FIO2 60 L      Specimen Type VENOUS    Salicylate level [[id number]]  (Abnormal) Collected: 05/23/21 1929    Lab Status: Final result Specimen: Blood from Arm, Right Updated: 52/78/11 9533     Salicylate Lvl <3 mg/dL     Acetaminophen level-If concentration is detectable, please discuss with medical  on call   [[id number]]  (Abnormal) Collected: 05/23/21 1929    Lab Status: Final result Specimen: Blood from Arm, Right Updated: 05/23/21 2004     Acetaminophen Level <2 ug/mL     Ethanol [[id number]]  (Abnormal) Collected: 05/23/21 1929    Lab Status: Final result Specimen: Blood from Arm, Right Updated: 05/23/21 1958     Ethanol Lvl 427 mg/dL     Troponin I [[id number]]  (Normal) Collected: 05/23/21 1929    Lab Status: Final result Specimen: Blood from Arm, Right Updated: 05/23/21 1954     Troponin I <0 02 ng/mL     Rapid drug screen, urine [[id number]]  (Normal) Collected: 05/23/21 1932    Lab Status: Final result Specimen: Urine, Catheter Updated: 05/23/21 1944     Amph/Meth UR Negative     Barbiturate Ur Negative     Benzodiazepine Urine Negative     Cocaine Urine Negative     Methadone Urine Negative     Opiate Urine Negative     PCP Ur Negative     THC Urine Negative     Oxycodone Urine Negative    Narrative:      FOR MEDICAL PURPOSES ONLY  IF CONFIRMATION NEEDED PLEASE CONTACT THE LAB WITHIN 5 DAYS      Drug Screen Cutoff Levels:  AMPHETAMINE/METHAMPHETAMINES  1000 ng/mL  BARBITURATES     200 ng/mL  BENZODIAZEPINES     200 ng/mL  COCAINE      300 ng/mL  METHADONE      300 ng/mL  OPIATES      300 ng/mL  PHENCYCLIDINE     25 ng/mL  THC       50 ng/mL  OXYCODONE      100 ng/mL    Blood gas, arterial [[id number]]     Lab Status: No result Specimen: Blood, Arterial     CBC and differential [[id number]]  (Abnormal) Collected: 05/23/21 1929    Lab Status: Final result Specimen: Blood from Arm, Right Updated: 05/23/21 1936     WBC 6 75 Thousand/uL      RBC 3 83 Million/uL      Hemoglobin 13 8 g/dL      Hematocrit 41 0 %       fL      MCH 36 0 pg      MCHC 33 7 g/dL      RDW 14 3 %      MPV 8 4 fL      Platelets 523 Thousands/uL      nRBC 0 /100 WBCs      Neutrophils Relative 51 %      Immat GRANS % 0 %      Lymphocytes Relative 32 %      Monocytes Relative 15 %      Eosinophils Relative 1 %      Basophils Relative 1 %      Neutrophils Absolute 3 43 Thousands/µL      Immature Grans Absolute 0 02 Thousand/uL      Lymphocytes Absolute 2 19 Thousands/µL      Monocytes Absolute 0 98 Thousand/µL      Eosinophils Absolute 0 05 Thousand/µL      Basophils Absolute 0 08 Thousands/µL                  CT head without contrast   Final Result by Kane Morris MD (05/23 2244)      No acute intracranial abnormality  Mild chronic small vessel ischemic changes  Workstation performed: [id number]         XR chest 1 view portable   ED Interpretation by Willy Perry MD (05/23 1943)   This film was interpreted independently by me  ETT tube about 6 5 cm above Lisa, could be advanced 2 cm  Procedures  ECG 12 Lead Documentation Only    Date/Time: 5/23/2021 7:06 PM  Performed by: Willy Perry MD  Authorized by: Willy Perry MD     Indications / Diagnosis:  Unresponsiveness  ECG reviewed by me, the ED Provider: yes    Patient location:  ED  Previous ECG:     Previous ECG:  Compared to current    Comparison ECG info:  12/11/2020  Interpretation:     Interpretation: abnormal    Rate:     ECG rate:  86    ECG rate assessment: normal    Rhythm:     Rhythm: sinus rhythm    Ectopy:     Ectopy: none    QRS:     QRS axis:  Normal  Conduction:     Conduction: normal    ST segments:     ST segments:  Abnormal    Elevation:  V1, V2 and V3 (V1 and V3 are very slight, possibly less than 1mm)  T waves:     T waves: inverted      Inverted:  II, III, aVF, V5 and V6 (t-wave inversions were present on prior EKG)  Intubation    Date/Time: 5/23/2021 7:37 PM  Performed by: Willy Perry MD  Authorized by: Willy Perry MD     Patient location:  ED  Other Assisting Provider: No    Consent:     Consent obtained:  Emergent situation  Universal protocol:     Patient identity confirmed:  Arm band  Pre-procedure details:     Patient status:  Unresponsive    Mallampati score:  2    Pretreatment medications:  Etomidate    Paralytics:  Succinylcholine  Indications:     Indications for intubation: airway protection    Procedure details:     Preoxygenation:  Bag valve mask    CPR in progress: no      Intubation method:  Oral    Oral intubation technique:  Direct    Laryngoscope blade:   Mac 4    Tube size (mm): 8 0    Tube type:  Cuffed    Number of attempts:  1    Tube visualized through cords: yes    Placement assessment:     ETT to lip:  23    Tube secured with:  ETT frye    Breath sounds:  Equal    Placement verification: chest rise and CXR verification      CXR findings:  ETT in proper place  Post-procedure details:     Patient tolerance of procedure: Tolerated well, no immediate complications  CriticalCare Time  Performed by: Linnea Owens MD  Authorized by: Linnea Owens MD     Critical care provider statement:     Critical care time (minutes):  45    Critical care time was exclusive of:  Separately billable procedures and treating other patients and teaching time    Critical care was necessary to treat or prevent imminent or life-threatening deterioration of the following conditions:  CNS failure or compromise    Critical care was time spent personally by me on the following activities:  Blood draw for specimens, evaluation of patient's response to treatment, interpretation of cardiac output measurements, ordering and performing treatments and interventions, ordering and review of laboratory studies, ordering and review of radiographic studies, re-evaluation of patient's condition, review of old charts, examination of patient and ventilator management             ED Course  ED Course as of May 24 0045   Sun May 23, 2021   1944 We reached out to Dr Mariana Mckeon with cardiology upon patient arrival and sent him images of the pre-hospital EKG and our initial EKG  While there are concerning changes on these EKG's the patient's clinical picture is unclear and we will continue to work him up for altered mental status/unresponsiveness  We will repeat an ekg and will obtain a troponin  Dr Mariana Mckeon agrees with this plan                                                  MDM  Number of Diagnoses or Management Options  Acute alcohol intoxication in patient with alcoholism with blood alcohol level over 0 3, with delirium ([facility] ): new and requires workup  Unresponsive: new and requires workup  Diagnosis management comments: Background: 61 y o  male presents with unresponsive    Differential DX includes but is not limited to: STEMI, CVA, metabolic encephalopathy, Alcohol Intoxication, toxic encephalopathy, trauma/ich    Plan: cbc, cmp, uds, etoh, acetaminophen, asa, ct head, intubation for airway protection, admission          Amount and/or Complexity of Data Reviewed  Clinical lab tests: ordered and reviewed  Tests in the radiology section of CPT®: ordered and reviewed  Independent visualization of images, tracings, or specimens: yes    Risk of Complications, Morbidity, and/or Mortality  Presenting problems: high  Diagnostic procedures: high  Management options: high    Patient Progress  Patient progress: stable      Disposition  Final diagnoses:   Unresponsive   Acute alcohol intoxication in patient with alcoholism with blood alcohol level over 0 3, with delirium ([facility] [address] )     Time reflects when diagnosis was documented in both MDM as applicable and the Disposition within this note     Time User Action Codes Description Comment    5/23/2021 10:21 PM Topher Emerald Add [R41 89] Unresponsive     5/23/2021 10:21 PM Topher Nipomo Add [F10 221,  Y90 0] Acute alcohol intoxication in patient with alcoholism with blood alcohol level over 0 3, with delirium [facility])       ED Disposition     ED Disposition Condition Date/Time Comment    Admit Stable Sun May 23, 2021 10:20 PM Case was discussed with Dr Deshawn Fernandez and the patient's admission status was agreed to be Admission Status: inpatient status to the service of Dr Deshawn Fernandez           Follow-up Information    None         Current Discharge Medication List      CONTINUE these medications which have NOT CHANGED    Details   folic acid (FOLVITE) 544 mcg tablet Take 1 tablet (400 mcg total) by mouth daily  Qty: 30 tablet, Refills: 0    Associated Diagnoses: Alcohol intoxication (HCC)      magnesium oxide (MAG-OX) 400 mg Take 1 tablet (400 mg total) by mouth daily  Qty: 30 tablet, Refills: 0    Associated Diagnoses: Alcohol intoxication (HCC)      metFORMIN (GLUCOPHAGE) 500 mg tablet Take 1 tablet (500 mg total) by mouth 2 (two) times a day with meals  Qty: 60 tablet, Refills: 0    Associated Diagnoses: Type 2 diabetes mellitus (HCC)      nicotine (NICODERM CQ) 14 mg/24hr TD 24 hr patch Place 1 patch on the skin daily  Qty: 28 patch, Refills: 0    Associated Diagnoses: Tobacco abuse      thiamine 100 MG tablet Take 1 tablet (100 mg total) by mouth daily  Qty: 30 tablet, Refills: 0    Associated Diagnoses: Alcohol intoxication ([facility] )           No discharge procedures on file      PDMP Review     None          ED Provider  Electronically Signed by           Betzy Richardson MD  05/24/21 3639

## 2021-05-24 LAB
ALBUMIN SERPL BCP-MCNC: 3.1 G/DL (ref 3.5–5)
ALP SERPL-CCNC: 61 U/L (ref 46–116)
ALT SERPL W P-5'-P-CCNC: 45 U/L (ref 12–78)
ANION GAP SERPL CALCULATED.3IONS-SCNC: 10 MMOL/L (ref 4–13)
ANION GAP SERPL CALCULATED.3IONS-SCNC: 13 MMOL/L (ref 4–13)
AST SERPL W P-5'-P-CCNC: 53 U/L (ref 5–45)
ATRIAL RATE: 60 BPM
ATRIAL RATE: 86 BPM
ATRIAL RATE: 91 BPM
BASOPHILS # BLD AUTO: 0.05 THOUSANDS/ΜL (ref 0–0.1)
BASOPHILS NFR BLD AUTO: 1 % (ref 0–1)
BILIRUB SERPL-MCNC: 0.25 MG/DL (ref 0.2–1)
BUN SERPL-MCNC: 13 MG/DL (ref 5–25)
BUN SERPL-MCNC: 18 MG/DL (ref 5–25)
CALCIUM ALBUM COR SERPL-MCNC: 8.4 MG/DL (ref 8.3–10.1)
CALCIUM SERPL-MCNC: 7.7 MG/DL (ref 8.3–10.1)
CALCIUM SERPL-MCNC: 8.1 MG/DL (ref 8.3–10.1)
CHLORIDE SERPL-SCNC: 103 MMOL/L (ref 100–108)
CHLORIDE SERPL-SCNC: 104 MMOL/L (ref 100–108)
CO2 SERPL-SCNC: 24 MMOL/L (ref 21–32)
CO2 SERPL-SCNC: 27 MMOL/L (ref 21–32)
CREAT SERPL-MCNC: 0.51 MG/DL (ref 0.6–1.3)
CREAT SERPL-MCNC: 0.59 MG/DL (ref 0.6–1.3)
EOSINOPHIL # BLD AUTO: 0.05 THOUSAND/ΜL (ref 0–0.61)
EOSINOPHIL NFR BLD AUTO: 1 % (ref 0–6)
ERYTHROCYTE [DISTWIDTH] IN BLOOD BY AUTOMATED COUNT: 14.6 % (ref 11.6–15.1)
EST. AVERAGE GLUCOSE BLD GHB EST-MCNC: 237 MG/DL
GFR SERPL CREATININE-BSD FRML MDRD: 107 ML/MIN/1.73SQ M
GFR SERPL CREATININE-BSD FRML MDRD: 114 ML/MIN/1.73SQ M
GLUCOSE SERPL-MCNC: 178 MG/DL (ref 65–140)
GLUCOSE SERPL-MCNC: 212 MG/DL (ref 65–140)
GLUCOSE SERPL-MCNC: 232 MG/DL (ref 65–140)
GLUCOSE SERPL-MCNC: 237 MG/DL (ref 65–140)
GLUCOSE SERPL-MCNC: 243 MG/DL (ref 65–140)
GLUCOSE SERPL-MCNC: 245 MG/DL (ref 65–140)
GLUCOSE SERPL-MCNC: 248 MG/DL (ref 65–140)
HBA1C MFR BLD: 9.9 %
HCT VFR BLD AUTO: 38.4 % (ref 36.5–49.3)
HGB BLD-MCNC: 13 G/DL (ref 12–17)
IMM GRANULOCYTES # BLD AUTO: 0.03 THOUSAND/UL (ref 0–0.2)
IMM GRANULOCYTES NFR BLD AUTO: 1 % (ref 0–2)
LYMPHOCYTES # BLD AUTO: 2.8 THOUSANDS/ΜL (ref 0.6–4.47)
LYMPHOCYTES NFR BLD AUTO: 42 % (ref 14–44)
MAGNESIUM SERPL-MCNC: 2 MG/DL (ref 1.6–2.6)
MCH RBC QN AUTO: 36.4 PG (ref 26.8–34.3)
MCHC RBC AUTO-ENTMCNC: 33.9 G/DL (ref 31.4–37.4)
MCV RBC AUTO: 108 FL (ref 82–98)
MONOCYTES # BLD AUTO: 0.94 THOUSAND/ΜL (ref 0.17–1.22)
MONOCYTES NFR BLD AUTO: 14 % (ref 4–12)
NEUTROPHILS # BLD AUTO: 2.7 THOUSANDS/ΜL (ref 1.85–7.62)
NEUTS SEG NFR BLD AUTO: 41 % (ref 43–75)
NRBC BLD AUTO-RTO: 0 /100 WBCS
P AXIS: 27 DEGREES
P AXIS: 33 DEGREES
P AXIS: 63 DEGREES
PHOSPHATE SERPL-MCNC: 3.7 MG/DL (ref 2.3–4.1)
PLATELET # BLD AUTO: 233 THOUSANDS/UL (ref 149–390)
PMV BLD AUTO: 9.3 FL (ref 8.9–12.7)
POTASSIUM SERPL-SCNC: 3.9 MMOL/L (ref 3.5–5.3)
POTASSIUM SERPL-SCNC: 5.5 MMOL/L (ref 3.5–5.3)
PR INTERVAL: 146 MS
PR INTERVAL: 146 MS
PR INTERVAL: 152 MS
PROT SERPL-MCNC: 6.8 G/DL (ref 6.4–8.2)
QRS AXIS: 70 DEGREES
QRS AXIS: 71 DEGREES
QRS AXIS: 73 DEGREES
QRSD INTERVAL: 92 MS
QRSD INTERVAL: 94 MS
QRSD INTERVAL: 94 MS
QT INTERVAL: 376 MS
QT INTERVAL: 382 MS
QT INTERVAL: 476 MS
QTC INTERVAL: 449 MS
QTC INTERVAL: 469 MS
QTC INTERVAL: 476 MS
RBC # BLD AUTO: 3.57 MILLION/UL (ref 3.88–5.62)
SODIUM SERPL-SCNC: 140 MMOL/L (ref 136–145)
SODIUM SERPL-SCNC: 141 MMOL/L (ref 136–145)
T WAVE AXIS: -58 DEGREES
T WAVE AXIS: -81 DEGREES
T WAVE AXIS: -84 DEGREES
TROPONIN I SERPL-MCNC: <0.02 NG/ML
VENTRICULAR RATE: 60 BPM
VENTRICULAR RATE: 86 BPM
VENTRICULAR RATE: 91 BPM
WBC # BLD AUTO: 6.57 THOUSAND/UL (ref 4.31–10.16)

## 2021-05-24 PROCEDURE — 94760 N-INVAS EAR/PLS OXIMETRY 1: CPT

## 2021-05-24 PROCEDURE — 99291 CRITICAL CARE FIRST HOUR: CPT | Performed by: INTERNAL MEDICINE

## 2021-05-24 PROCEDURE — 94150 VITAL CAPACITY TEST: CPT

## 2021-05-24 PROCEDURE — 85025 COMPLETE CBC W/AUTO DIFF WBC: CPT | Performed by: PHYSICIAN ASSISTANT

## 2021-05-24 PROCEDURE — 93005 ELECTROCARDIOGRAM TRACING: CPT

## 2021-05-24 PROCEDURE — 94003 VENT MGMT INPAT SUBQ DAY: CPT

## 2021-05-24 PROCEDURE — 80048 BASIC METABOLIC PNL TOTAL CA: CPT | Performed by: PHYSICIAN ASSISTANT

## 2021-05-24 PROCEDURE — 82948 REAGENT STRIP/BLOOD GLUCOSE: CPT

## 2021-05-24 PROCEDURE — 84100 ASSAY OF PHOSPHORUS: CPT | Performed by: PHYSICIAN ASSISTANT

## 2021-05-24 PROCEDURE — 94762 N-INVAS EAR/PLS OXIMTRY CONT: CPT

## 2021-05-24 PROCEDURE — 93010 ELECTROCARDIOGRAM REPORT: CPT | Performed by: INTERNAL MEDICINE

## 2021-05-24 PROCEDURE — 84484 ASSAY OF TROPONIN QUANT: CPT | Performed by: PHYSICIAN ASSISTANT

## 2021-05-24 PROCEDURE — 83735 ASSAY OF MAGNESIUM: CPT | Performed by: PHYSICIAN ASSISTANT

## 2021-05-24 PROCEDURE — 80053 COMPREHEN METABOLIC PANEL: CPT | Performed by: PHYSICIAN ASSISTANT

## 2021-05-24 RX ORDER — FENTANYL CITRATE 50 UG/ML
50 INJECTION, SOLUTION INTRAMUSCULAR; INTRAVENOUS
Status: DISCONTINUED | OUTPATIENT
Start: 2021-05-24 | End: 2021-05-24

## 2021-05-24 RX ORDER — FENTANYL CITRATE 50 UG/ML
INJECTION, SOLUTION INTRAMUSCULAR; INTRAVENOUS
Status: COMPLETED
Start: 2021-05-24 | End: 2021-05-24

## 2021-05-24 RX ORDER — SODIUM CHLORIDE, SODIUM GLUCONATE, SODIUM ACETATE, POTASSIUM CHLORIDE, MAGNESIUM CHLORIDE, SODIUM PHOSPHATE, DIBASIC, AND POTASSIUM PHOSPHATE .53; .5; .37; .037; .03; .012; .00082 G/100ML; G/100ML; G/100ML; G/100ML; G/100ML; G/100ML; G/100ML
1000 INJECTION, SOLUTION INTRAVENOUS ONCE
Status: COMPLETED | OUTPATIENT
Start: 2021-05-24 | End: 2021-05-24

## 2021-05-24 RX ORDER — DEXMEDETOMIDINE 100 UG/ML
.1-.7 INJECTION, SOLUTION, CONCENTRATE INTRAVENOUS
Status: DISCONTINUED | OUTPATIENT
Start: 2021-05-24 | End: 2021-05-24

## 2021-05-24 RX ORDER — INSULIN GLARGINE 100 [IU]/ML
10 INJECTION, SOLUTION SUBCUTANEOUS
Status: DISCONTINUED | OUTPATIENT
Start: 2021-05-24 | End: 2021-05-25 | Stop reason: HOSPADM

## 2021-05-24 RX ORDER — FOLIC ACID 1 MG/1
1 TABLET ORAL DAILY
Status: DISCONTINUED | OUTPATIENT
Start: 2021-05-25 | End: 2021-05-25 | Stop reason: HOSPADM

## 2021-05-24 RX ORDER — POTASSIUM CHLORIDE 20 MEQ/1
20 TABLET, EXTENDED RELEASE ORAL ONCE
Status: COMPLETED | OUTPATIENT
Start: 2021-05-24 | End: 2021-05-24

## 2021-05-24 RX ADMIN — INSULIN LISPRO 3 UNITS: 100 INJECTION, SOLUTION INTRAVENOUS; SUBCUTANEOUS at 05:01

## 2021-05-24 RX ADMIN — FOLIC ACID 1 MG: 1 TABLET ORAL at 08:06

## 2021-05-24 RX ADMIN — MULTIPLE VITAMINS W/ MINERALS TAB 1 TABLET: TAB ORAL at 08:06

## 2021-05-24 RX ADMIN — FENTANYL CITRATE 50 MCG: 50 INJECTION INTRAMUSCULAR; INTRAVENOUS at 03:27

## 2021-05-24 RX ADMIN — CHLORHEXIDINE GLUCONATE 0.12% ORAL RINSE 15 ML: 1.2 LIQUID ORAL at 00:29

## 2021-05-24 RX ADMIN — SODIUM CHLORIDE, SODIUM GLUCONATE, SODIUM ACETATE, POTASSIUM CHLORIDE, MAGNESIUM CHLORIDE, SODIUM PHOSPHATE, DIBASIC, AND POTASSIUM PHOSPHATE 125 ML/HR: .53; .5; .37; .037; .03; .012; .00082 INJECTION, SOLUTION INTRAVENOUS at 23:45

## 2021-05-24 RX ADMIN — INSULIN GLARGINE 10 UNITS: 100 INJECTION, SOLUTION SUBCUTANEOUS at 21:31

## 2021-05-24 RX ADMIN — VITAM B12 100 MCG: 100 TAB at 10:12

## 2021-05-24 RX ADMIN — PROPOFOL 45 MCG/KG/MIN: 10 INJECTION, EMULSION INTRAVENOUS at 00:29

## 2021-05-24 RX ADMIN — SODIUM CHLORIDE, SODIUM GLUCONATE, SODIUM ACETATE, POTASSIUM CHLORIDE, MAGNESIUM CHLORIDE, SODIUM PHOSPHATE, DIBASIC, AND POTASSIUM PHOSPHATE 125 ML/HR: .53; .5; .37; .037; .03; .012; .00082 INJECTION, SOLUTION INTRAVENOUS at 00:08

## 2021-05-24 RX ADMIN — POTASSIUM CHLORIDE 20 MEQ: 1500 TABLET, EXTENDED RELEASE ORAL at 18:10

## 2021-05-24 RX ADMIN — Medication 20 MG: at 10:55

## 2021-05-24 RX ADMIN — SODIUM CHLORIDE, SODIUM GLUCONATE, SODIUM ACETATE, POTASSIUM CHLORIDE, MAGNESIUM CHLORIDE, SODIUM PHOSPHATE, DIBASIC, AND POTASSIUM PHOSPHATE 125 ML/HR: .53; .5; .37; .037; .03; .012; .00082 INJECTION, SOLUTION INTRAVENOUS at 17:33

## 2021-05-24 RX ADMIN — INSULIN LISPRO 8 UNITS: 100 INJECTION, SOLUTION INTRAVENOUS; SUBCUTANEOUS at 16:32

## 2021-05-24 RX ADMIN — ENOXAPARIN SODIUM 40 MG: 40 INJECTION SUBCUTANEOUS at 08:06

## 2021-05-24 RX ADMIN — INSULIN LISPRO 1 UNITS: 100 INJECTION, SOLUTION INTRAVENOUS; SUBCUTANEOUS at 21:31

## 2021-05-24 RX ADMIN — SODIUM CHLORIDE, SODIUM GLUCONATE, SODIUM ACETATE, POTASSIUM CHLORIDE, MAGNESIUM CHLORIDE, SODIUM PHOSPHATE, DIBASIC, AND POTASSIUM PHOSPHATE 1000 ML: .53; .5; .37; .037; .03; .012; .00082 INJECTION, SOLUTION INTRAVENOUS at 08:00

## 2021-05-24 RX ADMIN — SODIUM CHLORIDE, SODIUM GLUCONATE, SODIUM ACETATE, POTASSIUM CHLORIDE, MAGNESIUM CHLORIDE, SODIUM PHOSPHATE, DIBASIC, AND POTASSIUM PHOSPHATE 125 ML/HR: .53; .5; .37; .037; .03; .012; .00082 INJECTION, SOLUTION INTRAVENOUS at 09:05

## 2021-05-24 RX ADMIN — NICOTINE 1 PATCH: 14 PATCH, EXTENDED RELEASE TRANSDERMAL at 08:06

## 2021-05-24 RX ADMIN — PROPOFOL 50 MCG/KG/MIN: 10 INJECTION, EMULSION INTRAVENOUS at 03:22

## 2021-05-24 RX ADMIN — CHLORHEXIDINE GLUCONATE 0.12% ORAL RINSE 15 ML: 1.2 LIQUID ORAL at 08:06

## 2021-05-24 RX ADMIN — FENTANYL CITRATE 50 MCG: 50 INJECTION INTRAMUSCULAR; INTRAVENOUS at 05:19

## 2021-05-24 RX ADMIN — INSULIN LISPRO 8 UNITS: 100 INJECTION, SOLUTION INTRAVENOUS; SUBCUTANEOUS at 12:23

## 2021-05-24 RX ADMIN — SODIUM CHLORIDE 0.2 MCG/KG/HR: 9 INJECTION, SOLUTION INTRAVENOUS at 05:34

## 2021-05-24 NOTE — PROGRESS NOTES
Was called to ED  for pt coming in unresponsive arriving in [facility] 27  Upon arrival pt was on non-rebreather spo2 was 98% on NRB  Pt was tubed w/o complications intially @ 23 @lips  After xray Dr requested tube be advanced 2 cm  Tube was found at 24 @ the lip and advanced to 26  Abg drawn results on AC/VC 18 500 60% +6 WERE 7 39/41 4/345/25 3   Will ctm

## 2021-05-24 NOTE — PLAN OF CARE
Problem: Potential for Falls  Goal: Patient will remain free of falls  Description: INTERVENTIONS:  - Assess patient frequently for physical needs  -  Identify cognitive and physical deficits and behaviors that affect risk of falls    -  [place] fall precautions as indicated by assessment   - Educate patient/family on patient safety including physical limitations  - Instruct patient to call for assistance with activity based on assessment  - Modify environment to reduce risk of injury  - Consider OT/PT consult to assist with strengthening/mobility  Outcome: Not Progressing

## 2021-05-24 NOTE — ASSESSMENT & PLAN NOTE
· Patient presented unresponsive with an ETOH level >400  · He was admitted for acute ETOH intoxication in 12/2020   He refused inpatient therapy at that time  · Will have patient evaluated by SW to discuss rehab options   · Continue folic acid, thiamine and MV  · Will need CIWA once extubated for risk of ETOH w/d

## 2021-05-24 NOTE — ASSESSMENT & PLAN NOTE
· Patient presented unresponsive with an ETOH level >400  · He was admitted for acute ETOH intoxication in 12/2020   He refused inpatient therapy at that time  · Will have patient evaluated by SW to discuss rehab options   · Start folic acid, thiamine and MV

## 2021-05-24 NOTE — ASSESSMENT & PLAN NOTE
Lab Results   Component Value Date    HGBA1C 8 7 (H) 12/11/2020       Recent Labs     05/23/21  2353   POCGLU 243*       Blood Sugar Average: Last 72 hrs:  (P) 243   · Patient diagnosed with DM 2 in 12/2020 and started on metformin  · A1C pending  · Continue ISS  · Goal -180

## 2021-05-24 NOTE — H&P
[address] [facility] H&P- Soheila Eubanks 1955, 61 y o  male MRN: [id number]  Unit/Bed#: ED 27 Encounter: [id number]  Primary Care Provider: No primary care provider on file  Date and time admitted to hospital: 5/23/2021  7:00 PM    Alcohol intoxication [facility])  Assessment & Plan  · Patient presented unresponsive with an ETOH level >400  · He was admitted for acute ETOH intoxication in 12/2020  He refused inpatient therapy at that time  · Will have patient evaluated by SW to discuss rehab options   · Start folic acid, thiamine and MV    Acute respiratory failure  Assessment & Plan  · Patient intubated in the ED for airway protection secondary to altered mental status  · Keep intubated tonight  · Plan for SAT and SBT in the AM  · Continue propofol infusion for goal RASS -1    Tobacco abuse  Assessment & Plan  · Start nicoderm patch     Type 2 diabetes mellitus ([facility] [address] )  Assessment & Plan  Lab Results   Component Value Date    HGBA1C 8 7 (H) 12/11/2020       No results for input(s): POCGLU in the last 72 hours  Blood Sugar Average: Last 72 hrs:     · Patient diagnosed with DM 2 in 12/2020 and started on metformin  · Will check A1C  · Start on ISS   · Goal -180      -------------------------------------------------------------------------------------------------------------  Chief Complaint: Altered mental status    History of Present Illness   HX and PE limited by: altered mental status, mechanical ventilation   Soheila Eubanks is a 61 y o  male with a PMH of DM2 and ETOH abuse presented to the hospital via EMS with an altered mental status  According to the ED staff, neighbors called EMS after finding him unresponsive  The patient was intubated in the ED for airway protection  Patient remains intubated and sedated     He is unable to provide information  No emergency contact listed in his chart     History obtained from chart review and unobtainable from patient due to mental status   -------------------------------------------------------------------------------------------------------------  Dispo: Admit to Critical Care     Code Status: Prior  --------------------------------------------------------------------------------------------------------------  Review of Systems    Review of systems was unable to be performed secondary to mechanical ventilation, critical illness    Physical Exam  Vitals signs reviewed  Constitutional:       Comments: Middle aged male lying in bed on mechanical ventilation    HENT:      Head: Normocephalic  Nose: Nose normal       Mouth/Throat:      Mouth: Mucous membranes are moist    Eyes:      Pupils: Pupils are equal, round, and reactive to light  Neck:      Musculoskeletal: Neck supple  Cardiovascular:      Rate and Rhythm: Normal rate  Pulmonary:      Breath sounds: Normal breath sounds  Comments: Intubated on AC/VC 18/500/40/6  Abdominal:      General: There is no distension  Palpations: Abdomen is soft  Genitourinary:     Comments: Shai in place  Musculoskeletal: Normal range of motion  Skin:     General: Skin is warm  Capillary Refill: Capillary refill takes less than 2 seconds  Neurological:      Comments: Patient sedated on propofol   Follows commands in BL UE       --------------------------------------------------------------------------------------------------------------  Vitals:   Vitals:    05/23/21 2130 05/23/21 2145 05/23/21 2200 05/23/21 2215   BP: 149/76  120/68 109/62   BP Location: Right arm  Left arm    Pulse: 86 92 90 88   Resp:   18    Temp:  (!) 97 2 °F (36 2 °C)  97 5 °F (36 4 °C)   TempSrc:       SpO2: 98% 97% 97% 97%   Weight:         Temp  Min: 96 4 °F (35 8 °C)  Max: 97 5 °F (36 4 °C)        There is no height or weight on file to calculate BMI      Laboratory and Diagnostics:  Results from last 7 days   Lab Units 05/23/21 2000 05/23/21  1929   WBC Thousand/uL  --  6 75   HEMOGLOBIN g/dL --  13 8   I STAT HEMOGLOBIN g/dl 14 6  --    HEMATOCRIT %  --  41 0   HEMATOCRIT, ISTAT % 43  --    PLATELETS Thousands/uL  --  255   NEUTROS PCT %  --  51   MONOS PCT %  --  15*     Results from last 7 days   Lab Units 05/23/21 2000 05/23/21  1929   SODIUM mmol/L  --  137   POTASSIUM mmol/L  --  4 4   CHLORIDE mmol/L  --  101   CO2 mmol/L  --  24   CO2, I-STAT mmol/L 27  --    ANION GAP mmol/L  --  12   BUN mg/dL  --  14   CREATININE mg/dL  --  0 60   CALCIUM mg/dL  --  8 7   GLUCOSE RANDOM mg/dL  --  332*   ALT U/L  --  70   AST U/L  --  34   ALK PHOS U/L  --  70   ALBUMIN g/dL  --  3 6   TOTAL BILIRUBIN mg/dL  --  <0 10*               Results from last 7 days   Lab Units 05/23/21 1929   TROPONIN I ng/mL <0 02         ABG:    VBG:          Micro:        EKG: SR  Imaging: I have personally reviewed pertinent reports  and I have personally reviewed pertinent films in PACS      Historical Information   Past Medical History:   Diagnosis Date    [facility]     Depression     Diabetes mellitus ([facility] )     Hyperlipidemia     Hypertension      Past Surgical History:   Procedure Laterality Date    APPENDECTOMY      TONSILLECTOMY AND ADENOIDECTOMY       Social History   Social History     Substance and Sexual Activity   Alcohol Use Yes    Frequency: 2-3 times a week    Drinks per session: Patient refused    Binge frequency: Patient refused     Social History     Substance and Sexual Activity   Drug Use Not Currently     Social History     Tobacco Use   Smoking Status Current Every Day Smoker   Smokeless Tobacco Never Used     Exercise History: Unknown   Family History:   History reviewed  No pertinent family history    Family history unknown      Medications:  Current Facility-Administered Medications   Medication Dose Route Frequency    [START ON 5/24/2021] cyanocobalamin (VITAMIN B-12) tablet 100 mcg  100 mcg Per NG Tube Daily    [START ON 8/89/7291] folic acid (FOLVITE) tablet 1 mg  1 mg Per NG Tube Daily    [START ON 5/24/2021] multivitamin-minerals (CENTRUM) tablet 1 tablet  1 tablet Per NG Tube Daily    propofol (DIPRIVAN) 1000 mg in 100 mL infusion (premix)  5-50 mcg/kg/min Intravenous Titrated     Home medications:  Prior to Admission Medications   Prescriptions Last Dose Informant Patient Reported? Taking?   folic acid (FOLVITE) 864 mcg tablet   No No   Sig: Take 1 tablet (400 mcg total) by mouth daily   magnesium oxide (MAG-OX) 400 mg   No No   Sig: Take 1 tablet (400 mg total) by mouth daily   metFORMIN (GLUCOPHAGE) 500 mg tablet   No No   Sig: Take 1 tablet (500 mg total) by mouth 2 (two) times a day with meals   nicotine (NICODERM CQ) 14 mg/24hr TD 24 hr patch   No No   Sig: Place 1 patch on the skin daily   thiamine 100 MG tablet   No No   Sig: Take 1 tablet (100 mg total) by mouth daily      Facility-Administered Medications: None     Allergies:  No Known Allergies    ------------------------------------------------------------------------------------------------------------  Advance Directive and Living Will:      Power of :    POLST:    ------------------------------------------------------------------------------------------------------------  Anticipated Length of Stay is > 2 midnights    Care Time Delivered:   Upon my evaluation, this patient had a high probability of imminent or life-threatening deterioration due to respiratory failure, which required my direct attention, intervention, and personal management  I have personally provided 30 minutes ([phone]) of critical care time, exclusive of procedures, teaching, family meetings, and any prior time recorded by providers other than myself  Katharine DEAN Murphy        Portions of the record may have been created with voice recognition software  Occasional wrong word or "sound a like" substitutions may have occurred due to the inherent limitations of voice recognition software    Read the chart carefully and recognize, using context, where substitutions have occurred

## 2021-05-24 NOTE — UTILIZATION REVIEW
Initial Clinical Review    Admission: Date/Time/Statement:   Admission Orders (From admission, onward)     Ordered        05/23/21 2222  Inpatient Admission  Once                   Orders Placed This Encounter   Procedures    Inpatient Admission     Standing Status:   Standing     Number of Occurrences:   1     Order Specific Question:   Level of Care     Answer:   Critical Care [15]     Order Specific Question:   Estimated length of stay     Answer:   More than 2 Midnights     Order Specific Question:   Certification     Answer:   I certify that inpatient services are medically necessary for this patient for a duration of greater than two midnights  See H&P and MD Progress Notes for additional information about the patient's course of treatment  ED Arrival Information     Expected Arrival Acuity Means of Arrival Escorted By Service Admission Type    - 5/23/2021 19:00 [facility] Critical Care/ICU Emergency    Arrival Complaint    UNRESPONSIVE        Chief Complaint   Patient presents with    Medical Problem - Major     pt found unresponsive, neighbors called EMS        Initial Presentation: 61year old male found unresponsive by neighbors  Admitted to ICU level of care for alcohol intoxication, acute respiratory failure  He arrives pale, with altered mental status, GCS 6  Blood alcohol level very elevated  INtubated for airway protection  Sedated  Gibbons placed  CT head shows no acute abnormality  NPO  IV fluids with MVI, folic acid, thiamine started  Date: 5/24   Day 2:  Agitated overnight, requiring propofol and precedex  Transient hypotension with iv fluid bolus given  Follows commands when off sedation  Trial of PSV , noted tachypnea and placed back on AC/VC       ED Triage Vitals   Temperature Pulse Respirations Blood Pressure SpO2   05/23/21 1927 05/23/21 1902 05/23/21 1902 05/23/21 1902 05/23/21 1902   (!) 96 4 °F (35 8 °C) 86 16 143/70 97 %      Temp Source Heart Rate Source Patient Position - Orthostatic VS BP Location FiO2 (%)   05/23/21 2125 05/23/21 1902 05/23/21 1902 05/23/21 1902 05/23/21 2011   Bladder Monitor Lying Right arm 40      Pain Score       05/24/21 0327       Med Not Given for Pain - for MAR use only          Wt Readings from Last 1 Encounters:   05/24/21 68 6 kg (151 lb 3 8 oz)     Additional Vital Signs:   Date/Time  Temp  Pulse  Resp  BP  MAP (mmHg)  SpO2  FiO2 (%)  O2 Flow Rate (L/min)  O2 Device  Patient Position - Orthostatic VS   05/24/21 1000  --  73  20  --  --  94 %  --  --  --  --   05/24/21 0900  97 °F (36 1 °C)Abnormal   60  16  111/59  79  98 %  --  --  --  --   05/24/21 0806  --  --  --  --  --  98 %  40  --  Ventilator  --   05/24/21 0800  97 7 °F (36 5 °C)  67  15  106/62  79  98 %  --  --  --  --   05/24/21 0753  --  --  --  78/51Abnormal   59  --  --  --  --  --   05/24/21 0745  --  --  --  --  48  --  --  --  --  --   05/24/21 0730  --  --  --  68/44Abnormal    52  --  --  --  --  --   BP: CCAP Aware at 05/24/21 0730   05/24/21 0700  97 5 °F (36 4 °C)  73  14  86/50Abnormal   63  96 %  --  --  --  --   05/24/21 0530  96 1 °F (35 6 °C)Abnormal   84  29Abnormal   173/80Abnormal   114  95 %  --  --  --  --   05/24/21 0525  96 1 °F (35 6 °C)Abnormal   84  20  167/82  118  90 %  --  --  --  --   05/24/21 0515  95 5 °F (35 3 °C)Abnormal   84  31Abnormal   202/95Abnormal   136  98 %  --  --  --  --   05/24/21 0500  93 2 °F (34 °C)Abnormal   80  18  157/84  115  98 %  --  --  --  --   05/24/21 0445  --  --  --  --  --  --  40  --  Ventilator  Lying   05/24/21 0430  96 8 °F (36 °C)Abnormal   78  18  120/67  88  98 %  --  --  --  Lying   05/24/21 0415  96 8 °F (36 °C)Abnormal   77  18  120/69  89  98 %  --  --  --  --   05/24/21 0400  96 8 °F (36 °C)Abnormal   78  18  128/66  90  98 %  --  --  --  --   05/24/21 0000  97 3 °F (36 3 °C)Abnormal   91  18  115/56  78  97 %  --  --  --  --   05/23/21 2343  97 2 °F (36 2 °C)Abnormal   98  27Abnormal 238/112Abnormal   160  99 %  --  --  Ventilator  Lying   05/23/21 2315  --  86  18  142/77  105  97 %  --  --  Ventilator  Lying   05/23/21 2300  97 9 °F (36 6 °C)  86  18  114/65  84  97 %  --  --  Ventilator  Lying   05/23/21 2215  97 5 °F (36 4 °C)  88  --  109/62  81  97 %  --  --  --  --   05/23/21 2200  --  90  18  120/68  88  97 %  --  --  Ventilator  Lying   05/23/21 2145  97 2 °F (36 2 °C)Abnormal   92  --  --  --  97 %  --  --  --  --   05/23/21 2130  --  86  --  149/76  106  98 %  --  --  --  Lying   05/23/21 2125  96 4 °F (35 8 °C)Abnormal   90  --  213/93Abnormal   134  98 %  --  --  None (Room air)  Lying   05/23/21 19:40:07  --  92  16  200/103Abnormal   --  98 %  --  --  --  --   05/23/21 1939  97 2 °F (36 2 °C)Abnormal   92  --  --  --  --  --  --  --  --   05/23/21 1936  97 2 °F (36 2 °C)Abnormal   92  --  --  --  --  --  --  --  --   05/23/21 19:35:14  --  --  --  200/103Abnormal   146  --  --  --  --  --   05/23/21 1933  97 2 °F (36 2 °C)Abnormal   92  --  --  --  --  --  --  --  --   05/23/21 19:30:14  --  --  --  199/99Abnormal   143  --  --  --  --  --   05/23/21 1927  96 4 °F (35 8 °C)Abnormal   92  --  --  --  --  --  --  --  --   05/23/21 19:25:14  --  --  --  215/106Abnormal   152  --  --  --  --  --   05/23/21 1924  --  90  --  --  --  --  --  --  --  --   05/23/21 1921  --  90  --  --  --  --  --  --  --  --   05/23/21 19:15:14  --  --  --  132/72  96  --  --  --  --  --   05/23/21 1912  --  84  --  --  --  --  --  --  --  --   05/23/21 1902  --  86  16  143/70  100  97 %  --  --  None (Room        Pertinent Labs/Diagnostic Test Results:   5/23 CT head: No acute intracranial abnormality  Mild chronic small vessel ischemic changes     5/24 CXR: High ET tube, consider advancing       Results from last 7 days   Lab Units 05/24/21 0440 05/23/21 2000 05/23/21 1929   WBC Thousand/uL 6 57  --  6 75   HEMOGLOBIN g/dL 13 0  --  13 8   I STAT HEMOGLOBIN g/dl  --  14 6  --    HEMATOCRIT % 38 4  --  41 0   HEMATOCRIT, ISTAT %  --  43  --    PLATELETS Thousands/uL 233  --  255   NEUTROS ABS Thousands/µL 2 70  --  3 43         Results from last 7 days   Lab Units 05/24/21 0440 05/23/21 2000 05/23/21 1929   SODIUM mmol/L 141  --  137   POTASSIUM mmol/L 5 5*  --  4 4   CHLORIDE mmol/L 104  --  101   CO2 mmol/L 24  --  24   CO2, I-STAT mmol/L  --  27  --    ANION GAP mmol/L 13  --  12   BUN mg/dL 18  --  14   CREATININE mg/dL 0 59*  --  0 60   EGFR ml/min/1 73sq m 107  --  106   CALCIUM mg/dL 7 7*  --  8 7   MAGNESIUM mg/dL 2 0  --   --    PHOSPHORUS mg/dL 3 7  --   --      Results from last 7 days   Lab Units 05/24/21 0440 05/23/21 1929   AST U/L 53* 34   ALT U/L 45 70   ALK PHOS U/L 61 70   TOTAL PROTEIN g/dL 6 8 7 3   ALBUMIN g/dL 3 1* 3 6   TOTAL BILIRUBIN mg/dL 0 25 <0 10*     Results from last 7 days   Lab Units 05/24/21 0446 05/23/21  2353   POC GLUCOSE mg/dl 237* 243*     Results from last 7 days   Lab Units 05/24/21 0440 05/23/21 1929   GLUCOSE RANDOM mg/dL 248* 332*         Results from last 7 days   Lab Units 05/23/21 1929   HEMOGLOBIN A1C % 9 9*   EAG mg/dl 237       Results from last 7 days   Lab Units 05/23/21 2000   PH, ABUNDIO I-STAT  7 394   PCO2, ABUNDIO ISTAT mm HG 41 4*   PO2, ABUNDIO ISTAT mm  0*   HCO3, ABUNDIO ISTAT mmol/L 25 3   I STAT BASE EXC mmol/L 0   I STAT O2 SAT % 100*         Results from last 7 days   Lab Units 05/24/21 0440 05/23/21 2254 05/23/21 1929   TROPONIN I ng/mL <0 02 <0 02 <0 02                         Results from last 7 days   Lab Units 05/23/21 1932   AMPH/METH  Negative   BARBITURATE UR  Negative   BENZODIAZEPINE UR  Negative   COCAINE UR  Negative   METHADONE URINE  Negative   OPIATE UR  Negative   PCP UR  Negative   THC UR  Negative     Results from last 7 days   Lab Units 05/23/21  1929   ETHANOL LVL mg/dL 427*   ACETAMINOPHEN LVL ug/mL <2*   SALICYLATE LVL mg/dL <3*     ED Treatment:   Medication Administration from 05/23/2021 1900 to 05/23/2021 1797 Date/Time Order Dose Route Action     05/23/2021 1912 etomidate (AMIDATE) 2 mg/mL injection 20 mg 20 mg Intravenous Given     05/23/2021 1912 Succinylcholine Chloride 100 mg/5 mL syringe 100 mg 100 mg Intravenous Given     05/23/2021 1917 propofol (DIPRIVAN) 1000 mg in 100 mL infusion (premix) 5 mcg/kg/min Intravenous New Bag        Past Medical History:   Diagnosis Date    [facility]     Depression     Diabetes mellitus ([place] [address] )     Hyperlipidemia     Hypertension      Admitting Diagnosis: Unresponsive [R41 89]  Acute alcohol intoxication in patient with alcoholism with blood alcohol level over 0 3, with delirium ([place] [address] ) [Q69 653, Y90 0]  Age/Sex: 61 y o  male  Admission Orders:  NPO  Reposition  CBC, BMP, mag, phos,   Scheduled Medications:  cyanocobalamin, 100 mcg, Per NG Tube, Daily  enoxaparin, 40 mg, Subcutaneous, Daily  folic acid, 1 mg, Per NG Tube, Daily  insulin lispro, 4-20 Units, Subcutaneous, Q6H [address]  multivitamin-minerals, 1 tablet, Per NG Tube, Daily  nicotine, 1 patch, Transdermal, Daily  omeprazole (PRILOSEC) suspension 2 mg/mL, 20 mg, Oral, Daily      Continuous IV Infusions:  multi-electrolyte, 125 mL/hr, Intravenous, Continuous      PRN Meds:       IP CONSULT TO CASE MANAGEMENT    Network Utilization Review Department  ATTENTION: Please call with any questions or concerns to [phone] and carefully listen to the prompts so that you are directed to the right person  All voicemails are confidential   Maren Christensen all requests for admission clinical reviews, approved or denied determinations and any other requests to dedicated fax number below belonging to the campus where the patient is receiving treatment   List of dedicated fax numbers for the Facilities:  [address] DENIALS (Administrative/Medical Necessity) [phone]   [address] [place] [address] (Maternity/NICU/Pediatrics) 261 [facility] [address] [address] [address] [facility] Charlotteida Vetodinorah [address] [address] [place] [address] Jesse Spann [facility] [address] [place] [address] [phone]

## 2021-05-24 NOTE — ASSESSMENT & PLAN NOTE
· Patient intubated in the ED for airway protection secondary to altered mental status  · Plan for SAT and SBT today  · Will transition to precedex infusion   · Goal RASS 0

## 2021-05-24 NOTE — ASSESSMENT & PLAN NOTE
· Patient intubated in the ED for airway protection secondary to altered mental status  · Keep intubated tonight  · Plan for SAT and SBT in the AM  · Continue propofol infusion for goal RASS -1

## 2021-05-24 NOTE — ASSESSMENT & PLAN NOTE
Lab Results   Component Value Date    HGBA1C 8 7 (H) 12/11/2020       Recent Labs     05/23/21  2353 05/24/21  0446   POCGLU 243* 237*       Blood Sugar Average: Last 72 hrs:  (P) 240

## 2021-05-24 NOTE — ASSESSMENT & PLAN NOTE
Lab Results   Component Value Date    HGBA1C 8 7 (H) 12/11/2020       No results for input(s): POCGLU in the last 72 hours      Blood Sugar Average: Last 72 hrs:     · Patient diagnosed with DM 2 in 12/2020 and started on metformin  · Will check A1C  · Start on ISS   · Goal -180

## 2021-05-24 NOTE — PROGRESS NOTES
[facility]  Critical Care Progress Note - Camille Gilbert 1955, 61 y o  male MRN: [id number]  Unit/Bed#: ICU 05 Encounter: [id number]  Primary Care Provider: No primary care provider on file  Date and time admitted to hospital: 5/23/2021  7:00 PM    * Alcohol intoxication ([facility] [address] )  Assessment & Plan  · Patient presented unresponsive with an ETOH level >400  · He was admitted for acute ETOH intoxication in 12/2020  He refused inpatient therapy at that time  · Will have patient evaluated by SW to discuss rehab options   · Continue folic acid, thiamine and MV  · Will need CIWA once extubated for risk of ETOH w/d    Acute respiratory failure  Assessment & Plan  · Patient intubated in the ED for airway protection secondary to altered mental status  · Plan for SAT and SBT today  · Will transition to precedex infusion   · Goal RASS 0    Tobacco abuse  Assessment & Plan  · Nicoderm patch     Type 2 diabetes mellitus ([facility] )  Assessment & Plan  Lab Results   Component Value Date    HGBA1C 8 7 (H) 12/11/2020       Recent Labs     05/23/21  2353   POCGLU 243*       Blood Sugar Average: Last 72 hrs:  (P) 243   · Patient diagnosed with DM 2 in 12/2020 and started on metformin  · A1C pending  · Continue ISS  · Goal -180      ----------------------------------------------------------------------------------------  HPI/24hr events: 66M with a PMH of DM and ETOH abuse presented yesterday after he was found unresponsive by his neighbors  He was intubated in the ED for airway protection   ETOH was 427 on admission   He remained intubated overnight   Changed to PSV this AM      Disposition: Continue Critical Care   Code Status: Level 1 - Full Code  ---------------------------------------------------------------------------------------  SUBJECTIVE  Patient intubated and sedated    Review of Systems  Review of systems was unable to be performed secondary to mechanical ventilation ---------------------------------------------------------------------------------------  OBJECTIVE    Vitals   Vitals:    21 0400 21 0415 21 0430 21 0445   BP: 128/66 120/69 120/67    BP Location:   Left arm    Pulse: 78 77 78    Resp: 18 18 18    Temp: (!) 96 8 °F (36 °C) (!) 96 8 °F (36 °C) (!) 96 8 °F (36 °C)    TempSrc:   Probe Probe   SpO2: 98% 98% 98%    Weight:         Temp (24hrs), Av 1 °F (36 2 °C), Min:96 4 °F (35 8 °C), Max:97 9 °F (36 6 °C)  Current: Temperature: (!) 96 8 °F (36 °C)          Respiratory:  SpO2: SpO2: 98 %, SpO2 Activity: SpO2 Activity: At Rest, SpO2 Device: O2 Device: Ventilator       Invasive/non-invasive ventilation settings   Respiratory    Lab Data (Last 4 hours)    None         O2/Vent Data (Last 4 hours)       0356           Vent Mode AC/VC       Resp Rate (BPM) (BPM) 18       Vt (mL) (mL) 500       FIO2 (%) (%) 40       PEEP (cmH2O) (cmH2O) 6       MV 9 14                   Physical Exam  Vitals signs reviewed  Constitutional:       Comments: Middle aged male lying in bed on mechanical ventilation    HENT:      Mouth/Throat:      Mouth: Mucous membranes are moist    Eyes:      Pupils: Pupils are equal, round, and reactive to light  Neck:      Musculoskeletal: Neck supple  Cardiovascular:      Rate and Rhythm: Normal rate  Pulmonary:      Comments: Intubated on AC/VC  18/500/40/6  Abdominal:      Palpations: Abdomen is soft  Musculoskeletal: Normal range of motion  Skin:     General: Skin is warm  Capillary Refill: Capillary refill takes less than 2 seconds     Neurological:      Comments: Wakes up off sedation  Agitated  Opens eyes  Follows commands  Shakes head "yes" and "no" to simple questions         Laboratory and Diagnostics:  Results from last 7 days   Lab Units 05/24/21  0440 21  1929   WBC Thousand/uL 6 57  --  6 75   HEMOGLOBIN g/dL 13 0  --  13 8   I STAT HEMOGLOBIN g/dl  --  14 6  -- HEMATOCRIT % 38 4  --  41 0   HEMATOCRIT, ISTAT %  --  43  --    PLATELETS Thousands/uL 233  --  255   NEUTROS PCT % 41*  --  51   MONOS PCT % 14*  --  15*     Results from last 7 days   Lab Units 05/23/21 2000 05/23/21  1929   SODIUM mmol/L  --  137   POTASSIUM mmol/L  --  4 4   CHLORIDE mmol/L  --  101   CO2 mmol/L  --  24   CO2, I-STAT mmol/L 27  --    ANION GAP mmol/L  --  12   BUN mg/dL  --  14   CREATININE mg/dL  --  0 60   CALCIUM mg/dL  --  8 7   GLUCOSE RANDOM mg/dL  --  332*   ALT U/L  --  70   AST U/L  --  34   ALK PHOS U/L  --  70   ALBUMIN g/dL  --  3 6   TOTAL BILIRUBIN mg/dL  --  <0 10*     Results from last 7 days   Lab Units 05/24/21  0440   MAGNESIUM mg/dL 2 0   PHOSPHORUS mg/dL 3 7           Results from last 7 days   Lab Units 05/23/21 2254 05/23/21  1929   TROPONIN I ng/mL <0 02 <0 02         ABG:    VBG:          Micro        EKG: SR  Imaging: I have personally reviewed pertinent reports  and I have personally reviewed pertinent films in PACS    Intake and Output  I/O       05/22 0701 - 05/23 0700 05/23 0701 - 05/24 0700    Urine (mL/kg/hr)  1000    Total Output  1000    Net  -1000                Height and Weights         There is no height or weight on file to calculate BMI  Weight (last 2 days)     Date/Time   Weight    05/23/21 2343   68 6 (151 24)    05/23/21 1911   72 6 (160 05)                Nutrition       Diet Orders   (From admission, onward)             Start     Ordered    05/23/21 2330  Diet NPO  Diet effective now     Question Answer Comment   Diet Type NPO    RD to adjust diet per protocol?  Yes        05/23/21 2329                  Active Medications  Scheduled Meds:  Current Facility-Administered Medications   Medication Dose Route Frequency Provider Last Rate    chlorhexidine  15 mL Mouth/Throat Q12H [facility] DEAN Small      cyanocobalamin  100 mcg Per NG Tube Daily DEAN Small      dexmedetomidine  0 1-0 7 mcg/kg/hr Intravenous Titrated Ramona Suazo PA-C  enoxaparin  40 mg Subcutaneous Daily Jolinda [place], PA-ANH      fentanyl citrate (PF)  50 mcg Intravenous Q1H PRN Naommiranda Pierre, PA-ANH      folic acid  1 mg Per NG Tube Daily Jolinda [place], PA-C      insulin lispro  1-6 Units Subcutaneous Q6H [facility] [place] Jolinda [place], PA-C      multi-electrolyte  125 mL/hr Intravenous Continuous Jolinda [place], PA-C 125 mL/hr (05/24/21 0008)    multivitamin-minerals  1 tablet Per NG Tube Daily Jolinda Gard[place], PA-C      nicotine  1 patch Transdermal Daily Jolinda [place], PA-C      propofol  5-50 mcg/kg/min Intravenous Titrated Jolinda [place], PA-C 50 mcg/kg/min (05/24/21 0522)     Continuous Infusions:  dexmedetomidine, 0 1-0 7 mcg/kg/hr  multi-electrolyte, 125 mL/hr, Last Rate: 125 mL/hr (05/24/21 0008)  propofol, 5-50 mcg/kg/min, Last Rate: 50 mcg/kg/min (05/24/21 0522)      PRN Meds:   fentanyl citrate (PF), 50 mcg, Q1H PRN        Invasive Devices Review  Invasive Devices     Peripheral Intravenous Line            Peripheral IV Left Antecubital -- days    Peripheral IV 05/23/21 Right Wrist less than 1 day          Drain            NG/OG Tube Center mouth 1 day    Urethral Catheter Temperature probe 16 Fr  less than 1 day          Airway            ETT  Cuffed 8 mm less than 1 day                Rationale for remaining devices: Gibbons- remove if extubated  ETT- Trial SBT/SAT   ---------------------------------------------------------------------------------------  Advance Directive and Living Will:      Power of :    POLST:    ---------------------------------------------------------------------------------------  Care Time Delivered:   No Critical Care time spent       Jong Castellano PA-C      Portions of the record may have been created with voice recognition software  Occasional wrong word or "sound a like" substitutions may have occurred due to the inherent limitations of voice recognition software    Read the chart carefully and recognize, using context, where substitutions have occurred

## 2021-05-25 VITALS
WEIGHT: 167.55 LBS | HEART RATE: 80 BPM | HEIGHT: 73 IN | OXYGEN SATURATION: 94 % | TEMPERATURE: 98.8 F | DIASTOLIC BLOOD PRESSURE: 75 MMHG | RESPIRATION RATE: 18 BRPM | BODY MASS INDEX: 22.21 KG/M2 | SYSTOLIC BLOOD PRESSURE: 163 MMHG

## 2021-05-25 PROBLEM — I10 HYPERTENSION: Status: ACTIVE | Noted: 2021-05-25

## 2021-05-25 PROBLEM — D72.829 LEUKOCYTOSIS: Status: ACTIVE | Noted: 2021-05-25

## 2021-05-25 LAB
ALBUMIN SERPL BCP-MCNC: 2.5 G/DL (ref 3.5–5)
ALP SERPL-CCNC: 56 U/L (ref 46–116)
ALT SERPL W P-5'-P-CCNC: 56 U/L (ref 12–78)
ANION GAP SERPL CALCULATED.3IONS-SCNC: 13 MMOL/L (ref 4–13)
AST SERPL W P-5'-P-CCNC: 47 U/L (ref 5–45)
BASOPHILS # BLD AUTO: 0.03 THOUSANDS/ΜL (ref 0–0.1)
BASOPHILS NFR BLD AUTO: 0 % (ref 0–1)
BILIRUB SERPL-MCNC: 0.45 MG/DL (ref 0.2–1)
BUN SERPL-MCNC: 6 MG/DL (ref 5–25)
CALCIUM ALBUM COR SERPL-MCNC: 8.2 MG/DL (ref 8.3–10.1)
CALCIUM SERPL-MCNC: 7 MG/DL (ref 8.3–10.1)
CHLORIDE SERPL-SCNC: 100 MMOL/L (ref 100–108)
CO2 SERPL-SCNC: 24 MMOL/L (ref 21–32)
CREAT SERPL-MCNC: 0.38 MG/DL (ref 0.6–1.3)
EOSINOPHIL # BLD AUTO: 0.02 THOUSAND/ΜL (ref 0–0.61)
EOSINOPHIL NFR BLD AUTO: 0 % (ref 0–6)
ERYTHROCYTE [DISTWIDTH] IN BLOOD BY AUTOMATED COUNT: 14 % (ref 11.6–15.1)
GFR SERPL CREATININE-BSD FRML MDRD: 128 ML/MIN/1.73SQ M
GLUCOSE SERPL-MCNC: 114 MG/DL (ref 65–140)
GLUCOSE SERPL-MCNC: 142 MG/DL (ref 65–140)
GLUCOSE SERPL-MCNC: 148 MG/DL (ref 65–140)
GLUCOSE SERPL-MCNC: 233 MG/DL (ref 65–140)
HCT VFR BLD AUTO: 31.7 % (ref 36.5–49.3)
HGB BLD-MCNC: 10.7 G/DL (ref 12–17)
IMM GRANULOCYTES # BLD AUTO: 0.04 THOUSAND/UL (ref 0–0.2)
IMM GRANULOCYTES NFR BLD AUTO: 0 % (ref 0–2)
LYMPHOCYTES # BLD AUTO: 1.6 THOUSANDS/ΜL (ref 0.6–4.47)
LYMPHOCYTES NFR BLD AUTO: 13 % (ref 14–44)
MAGNESIUM SERPL-MCNC: 2.1 MG/DL (ref 1.6–2.6)
MCH RBC QN AUTO: 36.3 PG (ref 26.8–34.3)
MCHC RBC AUTO-ENTMCNC: 33.8 G/DL (ref 31.4–37.4)
MCV RBC AUTO: 108 FL (ref 82–98)
MONOCYTES # BLD AUTO: 2.04 THOUSAND/ΜL (ref 0.17–1.22)
MONOCYTES NFR BLD AUTO: 17 % (ref 4–12)
NEUTROPHILS # BLD AUTO: 8.47 THOUSANDS/ΜL (ref 1.85–7.62)
NEUTS SEG NFR BLD AUTO: 70 % (ref 43–75)
NRBC BLD AUTO-RTO: 0 /100 WBCS
PHOSPHATE SERPL-MCNC: 2.7 MG/DL (ref 2.3–4.1)
PLATELET # BLD AUTO: 185 THOUSANDS/UL (ref 149–390)
PMV BLD AUTO: 8.6 FL (ref 8.9–12.7)
POTASSIUM SERPL-SCNC: 3.8 MMOL/L (ref 3.5–5.3)
PROT SERPL-MCNC: 5.3 G/DL (ref 6.4–8.2)
RBC # BLD AUTO: 2.95 MILLION/UL (ref 3.88–5.62)
SODIUM SERPL-SCNC: 137 MMOL/L (ref 136–145)
WBC # BLD AUTO: 12.2 THOUSAND/UL (ref 4.31–10.16)

## 2021-05-25 PROCEDURE — 80053 COMPREHEN METABOLIC PANEL: CPT | Performed by: PHYSICIAN ASSISTANT

## 2021-05-25 PROCEDURE — 83735 ASSAY OF MAGNESIUM: CPT | Performed by: PHYSICIAN ASSISTANT

## 2021-05-25 PROCEDURE — 85025 COMPLETE CBC W/AUTO DIFF WBC: CPT | Performed by: PHYSICIAN ASSISTANT

## 2021-05-25 PROCEDURE — 99239 HOSP IP/OBS DSCHRG MGMT >30: CPT | Performed by: INTERNAL MEDICINE

## 2021-05-25 PROCEDURE — 82948 REAGENT STRIP/BLOOD GLUCOSE: CPT

## 2021-05-25 PROCEDURE — 84100 ASSAY OF PHOSPHORUS: CPT | Performed by: PHYSICIAN ASSISTANT

## 2021-05-25 RX ORDER — GLUCOSAMINE HCL/CHONDROITIN SU 500-400 MG
CAPSULE ORAL
Qty: 200 EACH | Refills: 0 | Status: SHIPPED | OUTPATIENT
Start: 2021-05-25

## 2021-05-25 RX ORDER — BLOOD-GLUCOSE METER
KIT MISCELLANEOUS
Qty: 1 EACH | Refills: 0 | Status: SHIPPED | OUTPATIENT
Start: 2021-05-25

## 2021-05-25 RX ORDER — LISINOPRIL 5 MG/1
5 TABLET ORAL DAILY
Qty: 30 TABLET | Refills: 0 | Status: SHIPPED | OUTPATIENT
Start: 2021-05-25 | End: 2021-06-24

## 2021-05-25 RX ORDER — BLOOD SUGAR DIAGNOSTIC
STRIP MISCELLANEOUS
Qty: 200 EACH | Refills: 0 | Status: SHIPPED | OUTPATIENT
Start: 2021-05-25

## 2021-05-25 RX ORDER — INSULIN GLARGINE 100 [IU]/ML
10 INJECTION, SOLUTION SUBCUTANEOUS
Qty: 10 ML | Refills: 0 | Status: SHIPPED | OUTPATIENT
Start: 2021-05-25

## 2021-05-25 RX ORDER — LANCETS 28 GAUGE
EACH MISCELLANEOUS
Qty: 200 EACH | Refills: 0 | Status: SHIPPED | OUTPATIENT
Start: 2021-05-25

## 2021-05-25 RX ADMIN — Medication 20 MG: at 08:43

## 2021-05-25 RX ADMIN — NICOTINE 1 PATCH: 14 PATCH, EXTENDED RELEASE TRANSDERMAL at 08:44

## 2021-05-25 RX ADMIN — FOLIC ACID 1 MG: 1 TABLET ORAL at 08:42

## 2021-05-25 RX ADMIN — SODIUM CHLORIDE, SODIUM GLUCONATE, SODIUM ACETATE, POTASSIUM CHLORIDE, MAGNESIUM CHLORIDE, SODIUM PHOSPHATE, DIBASIC, AND POTASSIUM PHOSPHATE 125 ML/HR: .53; .5; .37; .037; .03; .012; .00082 INJECTION, SOLUTION INTRAVENOUS at 08:47

## 2021-05-25 RX ADMIN — INSULIN LISPRO 8 UNITS: 100 INJECTION, SOLUTION INTRAVENOUS; SUBCUTANEOUS at 11:55

## 2021-05-25 RX ADMIN — ENOXAPARIN SODIUM 40 MG: 40 INJECTION SUBCUTANEOUS at 08:42

## 2021-05-25 RX ADMIN — MULTIPLE VITAMINS W/ MINERALS TAB 1 TABLET: TAB ORAL at 08:42

## 2021-05-25 RX ADMIN — VITAM B12 100 MCG: 100 TAB at 08:42

## 2021-05-25 NOTE — PLAN OF CARE
Problem: Potential for Falls  Goal: Patient will remain free of falls  Description: INTERVENTIONS:  - Assess patient frequently for physical needs  -  Identify cognitive and physical deficits and behaviors that affect risk of falls  -  [place] fall precautions as indicated by assessment   - Educate patient/family on patient safety including physical limitations  - Instruct patient to call for assistance with activity based on assessment  - Modify environment to reduce risk of injury  - Consider OT/PT consult to assist with strengthening/mobility  Outcome: Progressing     Problem: Prexisting or High Potential for Compromised Skin Integrity  Goal: Skin integrity is maintained or improved  Description: INTERVENTIONS:  - Identify patients at risk for skin breakdown  - Assess and monitor skin integrity  - Assess and monitor nutrition and hydration status  - Monitor labs   - Assess for incontinence   - Turn and reposition patient  - Assist with mobility/ambulation  - Relieve pressure over bony prominences  - Avoid friction and shearing  - Provide appropriate hygiene as needed including keeping skin clean and dry  - Evaluate need for skin moisturizer/barrier cream  - Collaborate with interdisciplinary team   - Patient/family teaching  - Consider wound care consult   Outcome: Progressing     Problem: Nutrition/Hydration-ADULT  Goal: Nutrient/Hydration intake appropriate for improving, restoring or maintaining nutritional needs  Description: Monitor and assess patient's nutrition/hydration status for malnutrition  Collaborate with interdisciplinary team and initiate plan and interventions as ordered  Monitor patient's weight and dietary intake as ordered or per policy  Utilize nutrition screening tool and intervene as necessary  Determine patient's food preferences and provide high-protein, high-caloric foods as appropriate       INTERVENTIONS:  - Monitor oral intake, urinary output, labs, and treatment plans  - Assess nutrition and hydration status and recommend course of action  - Evaluate amount of meals eaten  - Assist patient with eating if necessary   - Allow adequate time for meals  - Recommend/ encourage appropriate diets, oral nutritional supplements, and vitamin/mineral supplements  - Order, calculate, and assess calorie counts as needed  - Recommend, monitor, and adjust tube feedings and TPN/PPN based on assessed needs  - Assess need for intravenous fluids  - Provide specific nutrition/hydration education as appropriate  - Include patient/family/caregiver in decisions related to nutrition  Outcome: Progressing

## 2021-05-25 NOTE — ASSESSMENT & PLAN NOTE
Lab Results   Component Value Date    HGBA1C 9 9 (H) 05/23/2021       Recent Labs     05/24/21  1632 05/24/21  2111 05/25/21  0733 05/25/21  1128   POCGLU 212* 178* 142* 233*       Blood Sugar Average: Last 72 hrs:  (P) 212 [id number]     - patient had metformin dose increased to 1000 mg b i d   - patient to start Lantus 10 units q h s   - diabetic monitoring supplies ordered  - patient to follow-up with Endocrinology and his PCP on discharge

## 2021-05-25 NOTE — ASSESSMENT & PLAN NOTE
- start lisinopril 5 mg daily  - patient to follow-up with his PCP and or establish care with a PCP on discharge

## 2021-05-25 NOTE — DISCHARGE INSTRUCTIONS
Chronic Hypertension   AMBULATORY CARE:   Hypertension  is high blood pressure  Your blood pressure is the force of your blood moving against the walls of your arteries  Hypertension causes your blood pressure to get so high that your heart has to work much harder than normal  This can damage your heart  Even if you have hypertension for years, lifestyle changes, medicines, or both can help bring your blood pressure to normal   Call 911 for any of the following:   · You have chest pain  · You have any of the following signs of a heart attack:      ? Squeezing, pressure, or pain in your chest    ? You may  also have any of the following:     § Discomfort or pain in your back, neck, jaw, stomach, or arm    § Shortness of breath    § Nausea or vomiting    § Lightheadedness or a sudden cold sweat    · You become confused or have difficulty speaking  · You suddenly feel lightheaded or have trouble breathing  Seek care immediately if:   · You have a severe headache or vision loss  · You have weakness in an arm or leg  Contact your healthcare provider if:   · You feel faint, dizzy, confused, or drowsy  · You have been taking your blood pressure medicine but your pressure is higher than your provider says it should be  · You have questions or concerns about your condition or care  Treatment for chronic hypertension  may include medicine to lower your blood pressure and cholesterol levels  A low cholesterol level helps prevent heart disease and makes it easier to control your blood pressure  Heart disease can make your blood pressure harder to control  You may also need to make lifestyle changes  What you need to know about the stages of hypertension:       · Normal blood pressure is 119/79 or lower   Your healthcare provider may only check your blood pressure each year if it stays at a normal level  · Elevated blood pressure is 120/79 to 129/79   This is sometimes called prehypertension   Your healthcare provider may suggest lifestyle changes to help lower your blood pressure to a normal level  He or she may then check it again in 3 to 6 months  · Stage 1 hypertension is 130/80  to 139/89   Your provider may recommend lifestyle changes, medication, and checks every 3 to 6 months until your blood pressure is controlled  · Stage 2 hypertension is 140/90 or higher   Your provider will recommend lifestyle changes and have you take 2 kinds of hypertension medicines  You will also need to have your blood pressure checked monthly until it is controlled  Manage chronic hypertension:   · Check your blood pressure at home  Avoid smoking, caffeine, and exercise at least 30 minutes before checking your blood pressure  Sit and rest for 5 minutes before you take your blood pressure  Extend your arm and support it on a flat surface  Your arm should be at the same level as your heart  Follow the directions that came with your blood pressure monitor  Check your blood pressure 2 times, 1 minute apart, before you take your medicine in the morning  Also check your blood pressure before your evening meal  Keep a record of your readings and bring it to your follow-up visits  Ask your healthcare provider what your blood pressure should be  · Manage any other health conditions you have  Health conditions such as diabetes can increase your risk for hypertension  Follow your healthcare provider's instructions and take all your medicines as directed  Talk to your healthcare provider about any new health conditions you have recently developed  · Ask about all medicines  Certain medicines can increase your blood pressure  Examples include oral birth control pills, decongestants, herbal supplements, and NSAIDs, such as ibuprofen  Your healthcare provider can tell you which medicines are safe for you to take  This includes prescription and over-the-counter medicines      Lifestyle changes you can make to lower your blood pressure: Your provider may want you to make more lifestyle changes if you are having trouble controlling your blood pressure  This may feel difficult over time, especially if you think you are making good changes but your pressure is still high  It might help to focus on one new change at a time  For example, try to add 1 more day of exercise, or exercise for an extra 10 minutes on 2 days  Small changes can make a big difference  Your healthcare provider can also refer you to specialists such as a dietitian who can help you make small changes  · Limit sodium (salt) as directed  Too much sodium can affect your fluid balance  Check labels to find low-sodium or no-salt-added foods  Some low-sodium foods use potassium salts for flavor  Too much potassium can also cause health problems  Your healthcare provider will tell you how much sodium and potassium are safe for you to have in a day  He or she may recommend that you limit sodium to 2,300 mg a day  · Follow the meal plan recommended by your healthcare provider  A dietitian or your provider can give you more information on low-sodium plans or the DASH (Dietary Approaches to Stop Hypertension) eating plan  The DASH plan is low in sodium, unhealthy fats, and total fat  It is high in potassium, calcium, and fiber  · Exercise to maintain a healthy weight  Exercise at least 30 minutes per day, on most days of the week  This will help decrease your blood pressure  Ask your healthcare provider about the best exercise plan for you  · Decrease stress  This may help lower your blood pressure  Learn ways to relax, such as deep breathing or listening to music  · Limit alcohol as directed  Alcohol can increase your blood pressure  A drink of alcohol is 12 ounces of beer, 5 ounces of wine, or 1½ ounces of liquor  · Do not smoke    Nicotine and other chemicals in cigarettes and cigars can increase your blood pressure and also cause lung damage  Ask your healthcare provider for information if you currently smoke and need help to quit  E-cigarettes or smokeless tobacco still contain nicotine  Talk to your healthcare provider before you use these products  Follow up with your healthcare provider as directed: You will need to return to have your blood pressure checked and to have other lab tests done  Write down your questions so you remember to ask them during your visits  © Copyright [address] Information is for End User's use only and may not be sold, redistributed or otherwise used for commercial purposes  All illustrations and images included in CareNotes® are the copyrighted property of A D A M , Inc  or [facility] [place] [employer]   The above information is an  only  It is not intended as medical advice for individual conditions or treatments  Talk to your doctor, nurse or pharmacist before following any medical regimen to see if it is safe and effective for you  How to Check your Blood Sugar   AMBULATORY CARE:   How to check your blood sugar: You will be taught how to check a small drop of blood with a glucose meter  Get all of your supplies together, such as your meter, test strips, and lancet device  Be sure to read the information that came with your meter  Use the following steps as a guide:  · Wash your hands  Use warm water to help blood flow  Dry your hands completely  · Put the test strip in the meter  This will turn on your meter  · Use the lancing device to stick your finger or area  Do not use the same finger or area every time  Stick the side of your fingertip, not the middle  The side may cause less pain  · Squeeze your fingertip or area gently  · Touch the drop of blood to the test strip  A number will appear on the meter after a few seconds  This is your blood sugar level  ·          Have someone call 911 for any of the following:   · You cannot be woken       · You have chest pain or shortness of breath  Seek care immediately if:   · You have a low blood sugar level and it does not improve with treatment  · Your blood sugar level is above 240 mg/dL and does not come down after you take a dose of insulin  · You have ketones in your blood or urine  · You have blurred or double vision  · Your breath has a fruity, sweet smell, or your breathing is shallow  · You have symptoms of a low blood sugar level, such as trouble thinking, sweating, or a pounding heartbeat  Contact your healthcare provider if:   · Your blood sugar levels are higher than your target goals  · You often have low blood sugar levels  · You have trouble coping with your illness or you feel anxious or depressed  · You have questions or concerns about your condition or care  When to check your blood sugar level:   · If you check your blood sugar level before a meal , it will show your lowest blood sugar  If you check your blood sugar level 2 hours after a meal , it will show your highest blood sugar  Ask your healthcare provider what good goals are for your blood sugar levels at different times  · You may need to check for ketones in your urine or blood if your blood sugar level is higher than directed  Follow up with your healthcare provider as directed: Write down your results and show them to your healthcare provider at every visit  Also, write down your questions so you remember to ask them during your visits  © Copyright [address] Information is for End User's use only and may not be sold, redistributed or otherwise used for commercial purposes  All illustrations and images included in CareNotes® are the copyrighted property of [employer]  or [facility] Jessica Solano   The above information is an  only  It is not intended as medical advice for individual conditions or treatments   Talk to your doctor, nurse or pharmacist before following any medical regimen to see if it is safe and effective for you  How to Draw Up Insulin   AMBULATORY CARE:   Insulin should be drawn up correctly and safely  This will help prevent problems such as infection or low or high blood sugar levels  Use the correct size insulin syringe to make sure you get the right dose of insulin  For example, you must inject U100 insulin with U100 syringes  A different syringe is needed for U500 insulin  Your healthcare provider or pharmacist will help you find the right size syringe  The syringe will have measurements in mL and units  Contact your healthcare provider if:   · You have questions about how to draw up insulin  · You cannot afford to buy your diabetes supplies  · You have questions or concerns about your condition or care  How to draw up 1 type of insulin into a syringe: If you use only 1 type of insulin at a time, do the following:  · Remove insulin from the refrigerator 30 minutes before you will use it  Inject insulin that is room temperature  · Wash your hands  This will help decrease your risk for an infection  · Gather your insulin supplies  Get your insulin bottle, syringe, and alcohol pads  Check the insulin bottle to make sure it is the right type and strength of insulin  Also check the expiration date  Do not use  insulin  Rapid and short-acting insulin should be clear with no particles  Do not use the insulin if there are clumps or particles in it  · Gently mix intermediate or long-acting insulin  These must be mixed before they are given  Turn the bottle on its side and roll it between the palms of your hands  Do not shake the bottle  This can make the insulin clump together  You do not need to mix rapid or short-acting insulin  · Prepare the insulin bottle  Clean the top of the insulin bottle with an alcohol pad or cotton swab dipped in alcohol  · Pull air into the syringe  Remove the cap from the needle   Pull back on the plunger to draw in an amount of air that is equal to your insulin dose  Place the bottle on a hard surface  Push the needle into the bottle top and inject the air into the bottle  Leave the needle in the bottle  This helps keep the correct amount of pressure in the bottle  It also makes it easier to draw up the insulin  · Draw up the insulin into the syringe  Turn the bottle and syringe upside down  Pull back on the plunger  Fill the syringe with a little more than the insulin dose you need  · Check the syringe for air bubbles  If you see bubbles, hold the bottle and syringe with 1 hand  Tap the syringe with 1 finger on your other hand  This will make the air bubbles rise to the top of the syringe  Slowly push on the plunger just enough to move out air and extra insulin  Check the syringe for the correct amount of insulin  · Remove the needle from the bottle  Do not let the end of the needle touch anything  Inject the insulin as directed  If you need to recap the needle, place the cap on a table or hard surface  Slowly slide the needle into the cap  How to draw up 2 types of insulin into a syringe: If you use 2 types of insulin at one time, do the following:  · Remove insulin from the refrigerator 30 minutes before you will use it  Inject insulin that is room temperature  · Wash your hands  This will help decrease your risk for an infection  · Gather your insulin supplies  Get your insulin bottle, syringe, and alcohol pads  Check the insulin bottle to make sure it is the right type and strength of insulin  Also check the expiration date  Do not use  insulin  Rapid and short-acting insulin should be clear with no particles  Do not use the insulin if there are clumps or particles in it  · Determine the total amount of insulin you need  Add the number of units of each type of insulin together   For example you may need 6 units of intermediate-acting insulin and 5 units of short-acting insulin  That is a total of 11 units of insulin  · Gently mix intermediate or long-acting insulin  These must be mixed before they are given  Turn the bottle on its side and roll it between the palms of your hands  Do not shake the bottle  This can make the insulin clump together  You do not need to mix the rapid or short-acting insulin  · Prepare the insulin bottles  Clean the top of both insulin bottles with an alcohol pad  · Prepare the syringe  Remove the cap from the needle  · Inject air into the intermediate or long-acting insulin bottle  This insulin should be cloudy  Pull back the plunger on the syringe to draw in an amount of air that is equal to your long-acting insulin dose  Place the bottle on a hard surface  Push the needle through the top of the long-acting insulin bottle and inject air into the bottle  Do not draw the insulin into the syringe  Remove the empty syringe and needle from the bottle  · Inject air into the short-acting insulin bottle  This insulin should be clear  Pull the plunger back to draw in enough air to equal your short-acting insulin dose  Push the needle in through the top of the short-acting insulin bottle  Inject air into the short-acting insulin bottle  Leave the needle in the bottle  · Draw up the short-acting insulin first  Turn the bottle and syringe upside down  Pull the plunger to fill the syringe with just a little more than the insulin dose you need  · Check the syringe for air bubbles  If you see any bubbles, hold the bottle and syringe with 1 hand  Tap the syringe with 1 finger on your other hand  This will make the air bubbles rise to the top of the syringe  Slowly push on the plunger to move out air and extra insulin  · Remove the needle from the bottle  Recheck your dose  · Draw up the intermediate or long-acting insulin  Insert the needle into the bottle of long-acting insulin   Turn the bottle and syringe upside down  Slowly pull on the plunger to draw insulin into the syringe  Because the short-acting insulin dose is already in the syringe, pull the plunger to the total number of units you need  · Check for bubbles  Hold the bottle and syringe with 1 hand  Gently tap the syringe with 1 finger on your other hand  This will make them rise to the top  Do not push air into the bottle  This may cause you to push the mixed insulin into the bottle  · Remove the needle from the bottle  Do not let the end of the needle touch anything  Inject the insulin as directed  If you need to recap the needle, place the cap on a table or hard surface  Slowly slide the needle into the cap  Follow up with your healthcare provider as directed: Write down your questions so you remember to ask them during your visits  © Copyright [address] Information is for End User's use only and may not be sold, redistributed or otherwise used for commercial purposes  All illustrations and images included in CareNotes® are the copyrighted property of A D A M , Inc  or [address] [place] [address]  The above information is an  only  It is not intended as medical advice for individual conditions or treatments  Talk to your doctor, nurse or pharmacist before following any medical regimen to see if it is safe and effective for you  How to Give an Insulin Injection   WHAT YOU NEED TO KNOW:   Insulin syringes come in different sizes depending on the dose of insulin you need  Use the correct size syringe to make sure you get the right dose of insulin  Your healthcare provider or pharmacist will help you find the right size syringe for you  DISCHARGE INSTRUCTIONS:   Contact your healthcare provider if:   · You feel or see hard lumps in your skin where you inject your insulin  · You think you gave yourself too much or not enough insulin  · Your injections are very painful       · You see blood or clear fluid on your injection site more than once after you inject insulin  · You have questions about how to give the injection  · You cannot afford to buy your diabetes supplies  · You have questions or concerns about your condition or care  Where to inject insulin:   · You can inject insulin into your abdomen, upper arm, buttocks, hip, and the front or side of the thigh  Insulin works fastest when it is injected into the abdomen  · Do not inject insulin into areas where you have a wound or bruising  Insulin injected into wounds or bruises may not get into your body correctly  · Use a different area within the site each time you inject insulin  For example, inject insulin into different areas in your abdomen  Insulin injected into the same area can cause lumps, swelling, or thickened skin  How to inject insulin with a syringe:   · Clean the skin where you will inject the insulin  You can use an alcohol pad or a cotton swab dipped in alcohol  · Grab a fold of your skin  Gently pinch the skin and fat between your thumb and first finger  · Insert the needle straight into your skin  Do not hold the syringe at an angle  Make sure the needle is all the way into the skin  Let go of the pinched tissue  · Push down on the plunger to inject the insulin  Press on the plunger until the insulin is gone  Keep the needle in place for 5 seconds after you inject the insulin  · Pull out the needle  Press on your injection site for 5 to 10 seconds  Do not rub  This will keep insulin from leaking out  · Throw away your used insulin syringe as directed  Do not recap the syringe before you throw it away  Decrease pain when you inject insulin:   · Inject insulin at room temperature  If the insulin has been stored in the refrigerator, remove it 30 minutes before you inject it  · Remove all air bubbles from the syringe before the injection       · If you clean your skin with an alcohol pad, wait until it has dried before you inject insulin  · Relax the muscles at the injection site  · Do not change the direction of the needle during insertion or removal      Reuse your syringe only as directed: You may increase your risk for a bacterial infection when you reuse syringes  Ask your healthcare provider if it is safe for you to reuse a syringe  Do not reuse a syringe if you have an open wound, trouble seeing, or have an infection  The following are tips on how to safely reuse a syringe:  · Recap the needle as soon as you are done using it  Place the cap on a table or hard surface and slide the needle into the cap  · Do not let the needle touch anything but clean skin or the top of the insulin bottle  · Never share syringes with anyone  · Do not clean your needle with alcohol  This will remove the coating that helps your needle slide easily into your skin  · Throw out any syringe that bends or touches anything other than clean skin  Where to get rid of used syringes: Ask your healthcare provider where to get rid of your syringes  He may tell you to place the syringe in a heavy-duty laundry detergent bottle or a metal coffee can  The container should have a cap that fits securely  Ask your local waste authority if you need to follow certain rules for getting rid of your syringes  Bring your used syringes home with you when you travel  Pack them in a plastic or metal container with a secure lid  Follow up with your healthcare provider as directed: Write down your questions so you remember to ask them during your visits  © Copyright [address] Information is for End User's use only and may not be sold, redistributed or otherwise used for commercial purposes  All illustrations and images included in CareNotes® are the copyrighted property of [employer]  or [facility] Jessica Solano   The above information is an  only   It is not intended as medical advice for individual conditions or treatments  Talk to your doctor, nurse or pharmacist before following any medical regimen to see if it is safe and effective for you  Alcohol Intoxication   WHAT YOU NEED TO KNOW:   Alcohol intoxication is a harmful physical condition caused when you drink more alcohol than your body can handle  It is also called ethanol poisoning, or being drunk  DISCHARGE INSTRUCTIONS:   Call your local emergency number (911 in the [address]) if:   · You have sudden trouble breathing or chest pain  · You have a seizure  · You feel sad enough to harm yourself or others  Call your doctor if:   · You have hallucinations (you see or hear things that are not real)  · You cannot stop vomiting  · You have questions or concerns about your condition or care  Recommended alcohol limits:   · Men 21 to 64 years  should limit alcohol to 2 drinks a day  Do not have more than 4 drinks in 1 day or more than 14 in 1 week  · All women, and men 72 or older  should limit alcohol to 1 drink in a day  Do not have more than 3 drinks in 1 day or more than 7 in 1 week  No amount of alcohol is okay during pregnancy  Manage alcohol use:   · Decrease the amount you drink  This can help prevent health problems such as brain, heart, and liver damage, high blood pressure, diabetes, and cancer  If you cannot stop completely, healthcare providers can help you set goals to decrease the amount you drink  · Plan weekly alcohol use  You will be less likely to drink more than the recommended limit if you plan ahead  · Have food when you drink alcohol  Food will prevent alcohol from getting into your system too quickly  Eat before you have your first alcohol drink  · Time your drinks carefully  Have no more than 1 drink in an hour  Have a liquid such as water, coffee, or a soft drink between alcohol drinks  · Do not drive if you have had alcohol    Make sure someone who has not been drinking can help you get home  · Do not drink alcohol if you are taking medicine  Alcohol is dangerous when you combine it with certain medicines, such as acetaminophen or blood pressure medicine  Talk to your healthcare provider about all the medicines you currently take  For more information:   · Alcoholics Anonymous  Web Address: http://www bullard info/    · Substance Abuse and [address] [place]  Web Address: https://[employer]/  Follow up with your healthcare provider as directed:  Write down your questions so you remember to ask them during your visits  © Copyright [address] [place] [address] Drive Information is for End User's use only and may not be sold, redistributed or otherwise used for commercial purposes  All illustrations and images included in CareNotes® are the copyrighted property of A D A M [employer] Inc  or [facility] [place] Jessica Solano   The above information is an  only  It is not intended as medical advice for individual conditions or treatments  Talk to your doctor, nurse or pharmacist before following any medical regimen to see if it is safe and effective for you

## 2021-05-25 NOTE — NURSING NOTE
IV removed  AVS reviewed with patient  Insulin education provided  Patient discharged in stable condition   Melissa Angelo RN

## 2021-05-25 NOTE — ASSESSMENT & PLAN NOTE
- most likely reactive, patient has no signs of infection  - patient can follow-up with PCP or establish care with PCP on discharge for follow-up    Results from last 7 days   Lab Units 05/25/21  0518 05/24/21  0440 05/23/21  1929   WBC Thousand/uL 12 20* 6 57 6 75

## 2021-05-25 NOTE — ASSESSMENT & PLAN NOTE
- Patient was intubated in the ED for airway protection secondary to altered mental status  - Resolved

## 2021-05-25 NOTE — DISCHARGE INSTR - AVS FIRST PAGE
Dear Chary Shell,     It was our pleasure to care for you here at [facility], [facility]  It is our hope that we were always able to exceed the expected standards for your care during your stay  You were hospitalized due to alcohol intoxication  You were cared for on the 3rd floor by Sanjeev Clemente DO under the service of Demetra Christianson MD with the [facility] who covers for your primary care physician (PCP), No primary care provider on file  , while you were hospitalized  If you have any questions or concerns related to this hospitalization, you may contact us at [phone]  For follow up as well as any medication refills, we recommend that you follow up with your primary care physician  A registered nurse will reach out to you by phone within a few days after your discharge to answer any additional questions that you may have after going home  However, at this time we provide for you here, the most important instructions / recommendations at discharge:     · Notable Medication Adjustments -   · Increase metformin to 1000 mg twice a day  · Start Lantus, 10 units daily at bedtime  · Start lisinopril 5 mg daily  · Testing Required after Discharge -   · None  · Important follow up information -   · Please follow-up with your primary care provider within 1 week particularly due to the fact that you have recently been started on insulin; we have provided you a primary care physician to follow up with, Dr Craft Genera  · Please follow-up with endocrinologist for your diabetes  · Other Instructions -   · Please continue to attend AA meetings  · Diabetic diet  · Hydrate yourself well every day  · Monitor your blood sugars at home and record and bring this record on follow-up with your primary care physician and diabetes doctor    · Monitor your blood pressures at home and record and bring this record on follow-up with your primary care physician and diabetes doctor  · Please review this entire after visit summary as additional general instructions including medication list, appointments, activity, diet, any pertinent wound care, and other additional recommendations from your care team that may be provided for you        Sincerely,     Bevely Nori, DO

## 2021-05-25 NOTE — PLAN OF CARE
Problem: Potential for Falls  Goal: Patient will remain free of falls  Description: INTERVENTIONS:  - Assess patient frequently for physical needs  -  Identify cognitive and physical deficits and behaviors that affect risk of falls  -  [place] fall precautions as indicated by assessment   - Educate patient/family on patient safety including physical limitations  - Instruct patient to call for assistance with activity based on assessment  - Modify environment to reduce risk of injury  - Consider OT/PT consult to assist with strengthening/mobility  Outcome: Progressing     Problem: Prexisting or High Potential for Compromised Skin Integrity  Goal: Skin integrity is maintained or improved  Description: INTERVENTIONS:  - Identify patients at risk for skin breakdown  - Assess and monitor skin integrity  - Assess and monitor nutrition and hydration status  - Monitor labs   - Assess for incontinence   - Turn and reposition patient  - Assist with mobility/ambulation  - Relieve pressure over bony prominences  - Avoid friction and shearing  - Provide appropriate hygiene as needed including keeping skin clean and dry  - Evaluate need for skin moisturizer/barrier cream  - Collaborate with interdisciplinary team   - Patient/family teaching  - Consider wound care consult   Outcome: Progressing     Problem: Nutrition/Hydration-ADULT  Goal: Nutrient/Hydration intake appropriate for improving, restoring or maintaining nutritional needs  Description: Monitor and assess patient's nutrition/hydration status for malnutrition  Collaborate with interdisciplinary team and initiate plan and interventions as ordered  Monitor patient's weight and dietary intake as ordered or per policy  Utilize nutrition screening tool and intervene as necessary  Determine patient's food preferences and provide high-protein, high-caloric foods as appropriate       INTERVENTIONS:  - Monitor oral intake, urinary output, labs, and treatment plans  - Assess nutrition and hydration status and recommend course of action  - Evaluate amount of meals eaten  - Assist patient with eating if necessary   - Allow adequate time for meals  - Recommend/ encourage appropriate diets, oral nutritional supplements, and vitamin/mineral supplements  - Order, calculate, and assess calorie counts as needed  - Recommend, monitor, and adjust tube feedings and TPN/PPN based on assessed needs  - Assess need for intravenous fluids  - Provide specific nutrition/hydration education as appropriate  - Include patient/family/caregiver in decisions related to nutrition  Outcome: Progressing

## 2021-05-25 NOTE — DISCHARGE SUMMARY
[facility]  Discharge- Nita Wadsworth 1955, 61 y o  male MRN: [id number]  Unit/Bed#: S -01 Encounter: [id number]  Primary Care Provider: No primary care provider on file  Date and time admitted to hospital: 5/23/2021  7:00 PM    Hypertension  Assessment & Plan  - start lisinopril 5 mg daily  - patient to follow-up with his PCP and or establish care with a PCP on discharge    Leukocytosis  Assessment & Plan  - most likely reactive, patient has no signs of infection  - patient can follow-up with PCP or establish care with PCP on discharge for follow-up    Results from last 7 days   Lab Units 05/25/21  0518 05/24/21  0440 05/23/21  1929   WBC Thousand/uL 12 20* 6 57 6 75         Acute respiratory failure  Assessment & Plan  - Patient was intubated in the ED for airway protection secondary to altered mental status  - Resolved    Tobacco abuse  Assessment & Plan  - smoking cessation advised    Type 2 diabetes mellitus ([facility] 75 )  Assessment & Plan  Lab Results   Component Value Date    HGBA1C 9 9 (H) 05/23/2021       Recent Labs     05/24/21  1632 05/24/21  2111 05/25/21  0733 05/25/21  1128   POCGLU 212* 178* 142* 233*       Blood Sugar Average: Last 72 hrs:  (P) 212 [id number]     - patient had metformin dose increased to 1000 mg b i d   - patient to start Lantus 10 units q h s   - diabetic monitoring supplies ordered  - patient to follow-up with Endocrinology and his PCP on discharge    * Alcohol intoxication ([facility] )  Assessment & Plan  - Patient presented unresponsive with an ETOH level >400  - He was admitted for acute ETOH intoxication in 12/2020  He refused inpatient therapy at that time  - Patient has refused outpatient alcohol rehab  - Continue folic acid, thiamine and MV      Discharging Resident Physician: Ronnie Lomeli DO  Attending: Kianna South MD  PCP: No primary care provider on file    Admission Date: 5/23/2021  Discharge Date: 05/25/21    Disposition: Home    Reason for Admission:  Alcohol intoxication    Consultations During Hospital Stay:  · None    Procedures Performed:     Intubation  Significant Findings / Test Results:     · Blood alcohol greater than 400    Incidental Findings:   · None    Test Results Pending at Discharge (will require follow up): · None     Outpatient Tests Requested:  · None    Complications:  None    Hospital Course:     Braydon Roland is a 61 y o  male patient who originally presented to the hospital on 5/23/2021 via EMS with an altered mental status  According to the ED staff, neighbors called EMS after finding him unresponsive  in the emergency room the patient was found to have an EtOH level greater than 400  The patient was intubated in the ED for airway protection  the patient was transferred to the ICU for additional management  The patient was successfully extubated and subsequently downgraded to medical surgical unit  The patient was stable for discharge  The patient will be discharged on 10 units of Lantus q h s  And he had his metformin increased to 1000 mg b i d  As his A1c was noted to be 9 9, along was 5 mg of lisinopril daily  Condition at Discharge: good     Discharge Day Visit / Exam:     Subjective:  Patient was seen examined at bedside  The patient reports that he is feeling well  The patient reports that he is ready for discharge  The patient had no complaints at this time  Patient denies  any headache, dizziness, nausea, vomiting, constipation, diarrhea, chest pain, palpitations, shortness of breath, wheezing  A 12 point review of systems was completed and was negative unless noted above      Vitals: Blood Pressure: 163/75 (05/25/21 1500)  Pulse: 80 (05/25/21 1500)  Temperature: 98 8 °F (37 1 °C) (05/25/21 1500)  Temp Source: Oral (05/25/21 1500)  Respirations: 18 (05/25/21 1500)  Height: 6' 1" (185 4 cm) (05/24/21 4833)  Weight - Scale: 76 kg (167 lb 8 8 oz) (05/25/21 0600)  SpO2: 94 % (05/25/21 1500)  Exam:   Physical Exam  Vitals signs and nursing note reviewed  Constitutional:       General: He is not in acute distress  Appearance: He is well-developed  He is not diaphoretic  HENT:      Head: Normocephalic and atraumatic  Nose: Nose normal    Eyes:      General:         Right eye: No discharge  Left eye: No discharge  Conjunctiva/sclera: Conjunctivae normal    Neck:      Musculoskeletal: Normal range of motion  Cardiovascular:      Rate and Rhythm: Normal rate and regular rhythm  Heart sounds: No murmur  No friction rub  No gallop  Pulmonary:      Effort: Pulmonary effort is normal  No respiratory distress  Breath sounds: Normal breath sounds  Abdominal:      General: Abdomen is flat  Palpations: Abdomen is soft  Musculoskeletal: Normal range of motion  Right lower leg: No edema  Left lower leg: No edema  Skin:     General: Skin is warm and dry  Neurological:      General: No focal deficit present  Mental Status: He is alert and oriented to person, place, and time  Psychiatric:         Behavior: Behavior normal          Thought Content: Thought content normal          Judgment: Judgment normal        Discussion with Family:  None patient refused    Discharge instructions/Information to patient and family:   See after visit summary for information provided to patient and family  Provisions for Follow-Up Care:  See after visit summary for information related to follow-up care and any pertinent home health orders  Planned Readmission:  None     Discharge Medications:  See after visit summary for reconciled discharge medications provided to patient and family        ** Please Note: This note has been constructed using a voice recognition system **

## 2021-05-25 NOTE — ASSESSMENT & PLAN NOTE
- Patient presented unresponsive with an ETOH level >400  - He was admitted for acute ETOH intoxication in 12/2020   He refused inpatient therapy at that time  - Patient has refused outpatient alcohol rehab  - Continue folic acid, thiamine and MV